# Patient Record
Sex: FEMALE | Race: WHITE | NOT HISPANIC OR LATINO | Employment: OTHER | URBAN - NONMETROPOLITAN AREA
[De-identification: names, ages, dates, MRNs, and addresses within clinical notes are randomized per-mention and may not be internally consistent; named-entity substitution may affect disease eponyms.]

---

## 2021-04-08 DIAGNOSIS — Z23 ENCOUNTER FOR IMMUNIZATION: ICD-10-CM

## 2023-10-23 ENCOUNTER — OFFICE VISIT (OUTPATIENT)
Dept: URGENT CARE | Facility: CLINIC | Age: 68
End: 2023-10-23
Payer: COMMERCIAL

## 2023-10-23 VITALS
WEIGHT: 195 LBS | RESPIRATION RATE: 18 BRPM | OXYGEN SATURATION: 98 % | HEART RATE: 74 BPM | BODY MASS INDEX: 34.55 KG/M2 | HEIGHT: 63 IN | TEMPERATURE: 96.7 F | DIASTOLIC BLOOD PRESSURE: 88 MMHG | SYSTOLIC BLOOD PRESSURE: 156 MMHG

## 2023-10-23 DIAGNOSIS — J01.00 ACUTE NON-RECURRENT MAXILLARY SINUSITIS: Primary | ICD-10-CM

## 2023-10-23 DIAGNOSIS — M79.645 PAIN OF LEFT THUMB: ICD-10-CM

## 2023-10-23 LAB
SARS-COV-2 AG UPPER RESP QL IA: NEGATIVE
VALID CONTROL: NORMAL

## 2023-10-23 PROCEDURE — 99213 OFFICE O/P EST LOW 20 MIN: CPT

## 2023-10-23 PROCEDURE — 87811 SARS-COV-2 COVID19 W/OPTIC: CPT

## 2023-10-23 PROCEDURE — S9083 URGENT CARE CENTER GLOBAL: HCPCS

## 2023-10-23 RX ORDER — LOSARTAN POTASSIUM 50 MG/1
50 TABLET ORAL EVERY MORNING
COMMUNITY
Start: 2023-10-04

## 2023-10-23 RX ORDER — LEVOTHYROXINE SODIUM 88 MCG
TABLET ORAL
COMMUNITY
Start: 2023-10-16

## 2023-10-23 RX ORDER — BENZONATATE 100 MG/1
100 CAPSULE ORAL 3 TIMES DAILY PRN
Qty: 20 CAPSULE | Refills: 0 | Status: SHIPPED | OUTPATIENT
Start: 2023-10-23 | End: 2023-10-30

## 2023-10-23 RX ORDER — AZITHROMYCIN 250 MG/1
TABLET, FILM COATED ORAL
Qty: 6 TABLET | Refills: 0 | Status: SHIPPED | OUTPATIENT
Start: 2023-10-23 | End: 2023-10-27

## 2023-10-23 RX ORDER — AZATHIOPRINE 50 MG/1
TABLET ORAL
COMMUNITY
Start: 2023-10-19

## 2023-10-23 NOTE — PROGRESS NOTES
[facility]        NAME: Ale Rodriguez is a 80 y.o. female  : 1955    MRN: [id number]  DATE: 2023  TIME: 4:55 PM    Assessment and Plan   Acute non-recurrent maxillary sinusitis [J01.00]  1. Acute non-recurrent maxillary sinusitis  Poct Covid 19 Rapid Antigen Test    azithromycin (ZITHROMAX) 250 mg tablet    benzonatate (TESSALON PERLES) 100 mg capsule      2. Pain of left thumb          Rapid POC COVID testing negative. Given symptom duration x6 days, will treat with azithromycin and Tessalon Perles. X-ray deferred at present time as lack of reported trauma/injury. Symptoms seem to correlate with osteoarthritis. Encouraged continued supportive measures. Follow up with PCP in 3-5 days or proceed to emergency department for worsening symptoms. Patient verbalized understanding of instructions given. Patient Instructions     Patient Instructions   Rapid POC COVID testing negative  Take antibiotic as prescribed  Continue with supportive measures, OTC Tylenol/Ibuprofen, nasal decongestants, and cough suppressants (Tessalon Perles)  Cool mist humidifiers, throat lozenges, increased fluid intake and rest   Follow up with PCP in 3-5 days  Present to ER if symptoms worsen     Sinusitis   AMBULATORY CARE:   Sinusitis  is inflammation or infection of your sinuses. Sinusitis is most often caused by a virus. Acute sinusitis may last up to 12 weeks. Chronic sinusitis lasts longer than 12 weeks. Recurrent sinusitis means you have 4 or more infections in 1 year.         Common signs and symptoms:   Fever    Pain, pressure, redness, or swelling around the forehead, cheeks, or eyes    Thick yellow or green discharge from your nose    Tenderness when you touch your face over your sinuses    Dry cough that happens mostly at night or when you lie down    Headache and face pain that is worse when you lean forward    Tooth pain, or pain when you chew    Seek care immediately if:   You have trouble breathing or wheezing that is getting worse. You have a stiff neck, a fever, or a bad headache. You cannot open your eye. Your eyeball bulges out or you cannot move your eye. You are more sleepy than normal, or you notice changes in your ability to think, move, or talk. You have swelling of your forehead or scalp. Call your doctor if:   You have vision changes, such as double vision. Your eye and eyelid are red, swollen, and painful. Your symptoms do not improve or go away after 10 days. You have nausea and are vomiting. Your nose is bleeding. You have questions or concerns about your condition or care. Medicines: Your symptoms may go away on their own. Your healthcare provider may recommend watchful waiting for up to 10 days before starting antibiotics. You may need any of the following:  Acetaminophen  decreases pain and fever. It is available without a doctor's order. Ask how much to take and how often to take it. Follow directions. Read the labels of all other medicines you are using to see if they also contain acetaminophen, or ask your doctor or pharmacist. Acetaminophen can cause liver damage if not taken correctly. NSAIDs , such as ibuprofen, help decrease swelling, pain, and fever. This medicine is available with or without a doctor's order. NSAIDs can cause stomach bleeding or kidney problems in certain people. If you take blood thinner medicine, always ask your healthcare provider if NSAIDs are safe for you. Always read the medicine label and follow directions. Nasal steroid sprays  may help decrease inflammation in your nose and sinuses. Decongestants  help reduce swelling and drain mucus in the nose and sinuses. They may help you breathe easier. Antihistamines  help dry mucus in the nose and relieve sneezing. Antibiotics  help treat or prevent a bacterial infection. Self-care:   Rinse your sinuses as directed.   Use a sinus rinse device to rinse your nasal passages with a saline (salt water) solution or distilled water. Do not use tap water. This will help thin the mucus in your nose and rinse away pollen and dirt. It will also help reduce swelling so you can breathe normally. Use a humidifier  to increase air moisture in your home. This may make it easier for you to breathe and help decrease your cough. Sleep with your head elevated. Place an extra pillow under your head before you go to sleep to help your sinuses drain. Drink liquids as directed. Ask your healthcare provider how much liquid to drink each day and which liquids are best for you. Liquids will thin the mucus in your nose and help it drain. Avoid drinks that contain alcohol or caffeine. Do not smoke, and avoid secondhand smoke. Nicotine and other chemicals in cigarettes and cigars can make your symptoms worse. Ask your healthcare provider for information if you currently smoke and need help to quit. E-cigarettes or smokeless tobacco still contain nicotine. Talk to your healthcare provider before you use these products. Prevent the spread of germs:   Wash your hands often with soap and water. Wash your hands after you use the bathroom, change a child's diaper, or sneeze. Wash your hands before you prepare or eat food. Stay away from people who are sick. Some germs spread easily and quickly through contact. Follow up with your doctor as directed: You may be referred to an ear, nose, and throat specialist. Write down your questions so you remember to ask them during your visits. © Copyright [employer] 2023 Information is for End User's use only and may not be sold, redistributed or otherwise used for commercial purposes. The above information is an  only. It is not intended as medical advice for individual conditions or treatments.  Talk to your doctor, nurse or pharmacist before following any medical regimen to see if it is safe and effective for you. Chief Complaint     Chief Complaint   Patient presents with    Cold Like Symptoms     C/o sore throat, headache, left side facial pain, nasal and chest congestion, and left thumb pain Onset x6 days ago, Pt states she had fertilizer spread in her yard and some was left on her driveway so she blew it off and symptoms started that same night so patient is concerned that is the cause. History of Present Illness       21-year-old female with a past medical history significant for hypertension presents with complaints of ongoing headache, nasal congestion, sinus pressure, postnasal drip, and cough x6 days. Denies fever, chills, vomiting, or diarrhea. No known sick contacts or exposures. Patient states that she has not been taking any OTC medications for her symptoms. Patient also reporting left thumb pain x2 days. Denies any specific known injury or trauma. Denies bruising, swelling, or redness. States that when she awakens in the morning feels "stiff" and then will have to perform movement to decrease stiffness and pain. Review of Systems   Review of Systems   Constitutional:  Negative for chills and fever. HENT:  Positive for congestion, postnasal drip, rhinorrhea, sinus pressure and sinus pain. Negative for ear discharge, ear pain, sore throat, trouble swallowing and voice change. Eyes:  Negative for discharge. Respiratory:  Positive for cough. Negative for shortness of breath and wheezing. Cardiovascular:  Negative for chest pain. Gastrointestinal:  Negative for abdominal pain, diarrhea, nausea and vomiting. Musculoskeletal:  Positive for arthralgias. Negative for joint swelling. Skin:  Negative for color change and rash. Neurological:  Negative for weakness and numbness.          Current Medications       Current Outpatient Medications:     azaTHIOprine (IMURAN) 50 mg tablet, , Disp: , Rfl:     azithromycin (ZITHROMAX) 250 mg tablet, Take 2 tablets today then 1 tablet daily x 4 days, Disp: 6 tablet, Rfl: 0    benzonatate (TESSALON PERLES) 100 mg capsule, Take 1 capsule (100 mg total) by mouth 3 (three) times a day as needed for cough for up to 7 days, Disp: 20 capsule, Rfl: 0    losartan (COZAAR) 50 mg tablet, 50 mg every morning, Disp: , Rfl:     Synthroid 88 MCG tablet, , Disp: , Rfl:     Current Allergies     Allergies as of 10/23/2023 - Reviewed 10/23/2023   Allergen Reaction Noted    Penicillins Rash 08/18/2014            The following portions of the patient's history were reviewed and updated as appropriate: allergies, current medications, past family history, past medical history, past social history, past surgical history and problem list.     Past Medical History:   Diagnosis Date    Autoimmune hepatitis ([address])     Disease of thyroid gland     Hypertension        Past Surgical History:   Procedure Laterality Date    KNEE SURGERY Bilateral        Family History   Problem Relation Age of Onset    Heart disease Mother     Cancer Father          Medications have been verified. Objective   /88   Pulse 74   Temp (!) 96.7 °F (35.9 °C)   Resp 18   Ht 5' 3" (1.6 m)   Wt 88.5 kg (195 lb)   SpO2 98%   BMI 34.54 kg/m²   No LMP recorded. Patient is postmenopausal.       Physical Exam     Physical Exam  Vitals and nursing note reviewed. Constitutional:       General: She is not in acute distress. Appearance: She is not toxic-appearing. HENT:      Head: Normocephalic. Right Ear: Tympanic membrane, ear canal and external ear normal.      Left Ear: Tympanic membrane, ear canal and external ear normal.      Nose: Congestion present. Right Sinus: No maxillary sinus tenderness or frontal sinus tenderness. Left Sinus: Maxillary sinus tenderness present. No frontal sinus tenderness. Mouth/Throat:      Mouth: Mucous membranes are moist.      Pharynx: Oropharynx is clear.    Eyes:      Conjunctiva/sclera: Conjunctivae normal. Cardiovascular:      Rate and Rhythm: Normal rate and regular rhythm. Heart sounds: Normal heart sounds. Pulmonary:      Effort: Pulmonary effort is normal. No respiratory distress. Breath sounds: Normal breath sounds. No stridor. No wheezing, rhonchi or rales. Musculoskeletal:         General: Tenderness present. No swelling. Left wrist: Normal.      Left hand: Bony tenderness present. No swelling. Normal range of motion. Normal strength. Normal sensation. Normal capillary refill. Comments: TTP diffusely over left thumb MCP joint without erythema or ecchymosis. No swelling. Full ROM. Lymphadenopathy:      Cervical: No cervical adenopathy. Skin:     General: Skin is warm and dry. Neurological:      Mental Status: She is alert and oriented to person, place, and time. Sensory: Sensation is intact. Motor: Motor function is intact. Gait: Gait is intact.    Psychiatric:         Mood and Affect: Mood normal.         Behavior: Behavior normal.

## 2023-10-23 NOTE — PATIENT INSTRUCTIONS
Rapid POC COVID testing negative  Take antibiotic as prescribed  Continue with supportive measures, OTC Tylenol/Ibuprofen, nasal decongestants, and cough suppressants (Tessalon Perles)  Cool mist humidifiers, throat lozenges, increased fluid intake and rest   Follow up with PCP in 3-5 days  Present to ER if symptoms worsen     Sinusitis   AMBULATORY CARE:   Sinusitis  is inflammation or infection of your sinuses. Sinusitis is most often caused by a virus. Acute sinusitis may last up to 12 weeks. Chronic sinusitis lasts longer than 12 weeks. Recurrent sinusitis means you have 4 or more infections in 1 year. Common signs and symptoms:   Fever    Pain, pressure, redness, or swelling around the forehead, cheeks, or eyes    Thick yellow or green discharge from your nose    Tenderness when you touch your face over your sinuses    Dry cough that happens mostly at night or when you lie down    Headache and face pain that is worse when you lean forward    Tooth pain, or pain when you chew    Seek care immediately if:   You have trouble breathing or wheezing that is getting worse. You have a stiff neck, a fever, or a bad headache. You cannot open your eye. Your eyeball bulges out or you cannot move your eye. You are more sleepy than normal, or you notice changes in your ability to think, move, or talk. You have swelling of your forehead or scalp. Call your doctor if:   You have vision changes, such as double vision. Your eye and eyelid are red, swollen, and painful. Your symptoms do not improve or go away after 10 days. You have nausea and are vomiting. Your nose is bleeding. You have questions or concerns about your condition or care. Medicines: Your symptoms may go away on their own. Your healthcare provider may recommend watchful waiting for up to 10 days before starting antibiotics. You may need any of the following:  Acetaminophen  decreases pain and fever.  It is available without a doctor's order. Ask how much to take and how often to take it. Follow directions. Read the labels of all other medicines you are using to see if they also contain acetaminophen, or ask your doctor or pharmacist. Acetaminophen can cause liver damage if not taken correctly. NSAIDs , such as ibuprofen, help decrease swelling, pain, and fever. This medicine is available with or without a doctor's order. NSAIDs can cause stomach bleeding or kidney problems in certain people. If you take blood thinner medicine, always ask your healthcare provider if NSAIDs are safe for you. Always read the medicine label and follow directions. Nasal steroid sprays  may help decrease inflammation in your nose and sinuses. Decongestants  help reduce swelling and drain mucus in the nose and sinuses. They may help you breathe easier. Antihistamines  help dry mucus in the nose and relieve sneezing. Antibiotics  help treat or prevent a bacterial infection. Self-care:   Rinse your sinuses as directed. Use a sinus rinse device to rinse your nasal passages with a saline (salt water) solution or distilled water. Do not use tap water. This will help thin the mucus in your nose and rinse away pollen and dirt. It will also help reduce swelling so you can breathe normally. Use a humidifier  to increase air moisture in your home. This may make it easier for you to breathe and help decrease your cough. Sleep with your head elevated. Place an extra pillow under your head before you go to sleep to help your sinuses drain. Drink liquids as directed. Ask your healthcare provider how much liquid to drink each day and which liquids are best for you. Liquids will thin the mucus in your nose and help it drain. Avoid drinks that contain alcohol or caffeine. Do not smoke, and avoid secondhand smoke. Nicotine and other chemicals in cigarettes and cigars can make your symptoms worse.  Ask your healthcare provider for information if you currently smoke and need help to quit. E-cigarettes or smokeless tobacco still contain nicotine. Talk to your healthcare provider before you use these products. Prevent the spread of germs:   Wash your hands often with soap and water. Wash your hands after you use the bathroom, change a child's diaper, or sneeze. Wash your hands before you prepare or eat food. Stay away from people who are sick. Some germs spread easily and quickly through contact. Follow up with your doctor as directed: You may be referred to an ear, nose, and throat specialist. Write down your questions so you remember to ask them during your visits. © Copyright [facility] 2023 Information is for End User's use only and may not be sold, redistributed or otherwise used for commercial purposes. The above information is an  only. It is not intended as medical advice for individual conditions or treatments. Talk to your doctor, nurse or pharmacist before following any medical regimen to see if it is safe and effective for you.

## 2023-12-28 DIAGNOSIS — M79.604 PAIN IN RIGHT LEG: ICD-10-CM

## 2023-12-28 DIAGNOSIS — R09.89 OTHER SPECIFIED SYMPTOMS AND SIGNS INVOLVING THE CIRCULATORY AND RESPIRATORY SYSTEMS: ICD-10-CM

## 2024-01-19 ENCOUNTER — HOSPITAL ENCOUNTER (OUTPATIENT)
Dept: NON INVASIVE DIAGNOSTICS | Facility: HOSPITAL | Age: 69
Discharge: HOME | End: 2024-01-19
Payer: COMMERCIAL

## 2024-01-19 VITALS
HEART RATE: 74 BPM | BODY MASS INDEX: 34.57 KG/M2 | SYSTOLIC BLOOD PRESSURE: 156 MMHG | HEIGHT: 63 IN | DIASTOLIC BLOOD PRESSURE: 88 MMHG | WEIGHT: 195.11 LBS

## 2024-01-19 DIAGNOSIS — R07.89 OTHER CHEST PAIN: ICD-10-CM

## 2024-01-19 DIAGNOSIS — I51.89 OTHER ILL-DEFINED HEART DISEASES: ICD-10-CM

## 2024-01-19 DIAGNOSIS — R79.89 OTHER SPECIFIED ABNORMAL FINDINGS OF BLOOD CHEMISTRY: ICD-10-CM

## 2024-01-19 DIAGNOSIS — R01.1 CARDIAC MURMUR, UNSPECIFIED: ICD-10-CM

## 2024-01-19 PROCEDURE — 93306 TTE W/DOPPLER COMPLETE: CPT

## 2024-01-21 LAB
AORTIC ROOT: 3.2 CM
AORTIC VALVE MEAN VELOCITY: 8.7 M/S
APICAL FOUR CHAMBER EJECTION FRACTION: 77 %
ASCENDING AORTA: 3 CM
AV AREA BY CONTINUOUS VTI: 3.8 CM2
AV AREA PEAK VELOCITY: 3.3 CM2
AV LVOT MEAN GRADIENT: 4 MMHG
AV LVOT PEAK GRADIENT: 9 MMHG
AV MEAN GRADIENT: 4 MMHG
AV PEAK GRADIENT: 8 MMHG
AV VALVE AREA: 3.79 CM2
AV VELOCITY RATIO: 1.06
BSA FOR ECHO PROCEDURE: 1.91 M2
DOP CALC AO PEAK VEL: 1.38 M/S
DOP CALC AO VTI: 28.81 CM
DOP CALC LVOT AREA: 3.14 CM2
DOP CALC LVOT CARDIAC INDEX: 4 L/MIN/M2
DOP CALC LVOT CARDIAC OUTPUT: 7.64 L/MIN
DOP CALC LVOT DIAMETER: 2 CM
DOP CALC LVOT PEAK VEL VTI: 34.78 CM
DOP CALC LVOT PEAK VEL: 1.46 M/S
DOP CALC LVOT STROKE INDEX: 59.2 ML/M2
DOP CALC LVOT STROKE VOLUME: 109.21
E WAVE DECELERATION TIME: 216 MS
E/A RATIO: 1.18
FRACTIONAL SHORTENING: 46 (ref 28–44)
INTERVENTRICULAR SEPTUM IN DIASTOLE (PARASTERNAL SHORT AXIS VIEW): 1 CM
INTERVENTRICULAR SEPTUM: 1 CM (ref 0.6–1.1)
LAAS-AP2: 18.2 CM2
LAAS-AP4: 19 CM2
LEFT ATRIUM SIZE: 3.3 CM
LEFT ATRIUM VOLUME (MOD BIPLANE): 55 ML
LEFT ATRIUM VOLUME INDEX (MOD BIPLANE): 28.8 ML/M2
LEFT INTERNAL DIMENSION IN SYSTOLE: 2 CM (ref 2.1–4)
LEFT VENTRICLE DIASTOLIC VOLUME (MOD BIPLANE): 54 ML
LEFT VENTRICLE DIASTOLIC VOLUME INDEX (MOD BIPLANE): 28.3 ML/M2
LEFT VENTRICLE SYSTOLIC VOLUME (MOD BIPLANE): 14 ML
LEFT VENTRICLE SYSTOLIC VOLUME INDEX (MOD BIPLANE): 7.3 ML/M2
LEFT VENTRICULAR INTERNAL DIMENSION IN DIASTOLE: 3.7 CM (ref 3.5–6)
LEFT VENTRICULAR POSTERIOR WALL IN END DIASTOLE: 1.2 CM
LEFT VENTRICULAR STROKE VOLUME: 45 ML
LV EF: 74 %
LVSV (TEICH): 45 ML
MV E'TISSUE VEL-LAT: 6 CM/S
MV E'TISSUE VEL-SEP: 6 CM/S
MV PEAK A VEL: 0.79 M/S
MV PEAK E VEL: 93 CM/S
MV STENOSIS PRESSURE HALF TIME: 63 MS
MV VALVE AREA P 1/2 METHOD: 3.49
RIGHT ATRIUM AREA SYSTOLE A4C: 19 CM2
RIGHT VENTRICLE ID DIMENSION: 3.3 CM
SL CV LEFT ATRIUM LENGTH A2C: 5.3 CM
SL CV LV EF: 65
SL CV PED ECHO LEFT VENTRICLE DIASTOLIC VOLUME (MOD BIPLANE) 2D: 58 ML
SL CV PED ECHO LEFT VENTRICLE SYSTOLIC VOLUME (MOD BIPLANE) 2D: 13 ML
TR MAX PG: 28 MMHG
TR PEAK VELOCITY: 2.7 M/S
TRICUSPID ANNULAR PLANE SYSTOLIC EXCURSION: 2.5 CM
TRICUSPID VALVE PEAK REGURGITATION VELOCITY: 2.65 M/S

## 2024-01-21 PROCEDURE — 93306 TTE W/DOPPLER COMPLETE: CPT | Performed by: INTERNAL MEDICINE

## 2024-02-19 ENCOUNTER — HOSPITAL ENCOUNTER (OUTPATIENT)
Dept: NON INVASIVE DIAGNOSTICS | Facility: HOSPITAL | Age: 69
Discharge: HOME | End: 2024-02-19
Attending: INTERNAL MEDICINE
Payer: COMMERCIAL

## 2024-02-19 DIAGNOSIS — I10 ESSENTIAL (PRIMARY) HYPERTENSION: ICD-10-CM

## 2024-02-19 DIAGNOSIS — R07.9 CHEST PAIN, UNSPECIFIED TYPE: ICD-10-CM

## 2024-02-19 LAB
CHEST PAIN STATEMENT: NORMAL
MAX DIASTOLIC BP: 80 MMHG
MAX HR PERCENT: 92 %
MAX HR: 141 BPM
MAX PREDICTED HEART RATE: 153 BPM
PROTOCOL NAME: NORMAL
RATE PRESSURE PRODUCT: NORMAL
REASON FOR TERMINATION: NORMAL
SL CV LV EF: 60
SL CV STRESS RECOVERY BP: NORMAL MMHG
SL CV STRESS RECOVERY HR: 88 BPM
SL CV STRESS RECOVERY O2 SAT: 97 %
SL CV STRESS STAGE REACHED: 1
STRESS ANGINA INDEX: 0
STRESS BASELINE BP: NORMAL MMHG
STRESS BASELINE HR: 66 BPM
STRESS O2 SAT REST: 96 %
STRESS PEAK HR: 141 BPM
STRESS POST ESTIMATED WORKLOAD: 4.6 METS
STRESS POST EXERCISE DUR MIN: 2 MIN
STRESS POST EXERCISE DUR MIN: 2 MIN
STRESS POST EXERCISE DUR SEC: 30 SEC
STRESS POST EXERCISE DUR SEC: 30 SEC
STRESS POST O2 SAT PEAK: 96 %
STRESS POST PEAK BP: 220 MMHG
STRESS POST PEAK HR: 141 BPM
STRESS POST PEAK SYSTOLIC BP: 220 MMHG
TARGET HR FORMULA: NORMAL
TEST INDICATION: NORMAL

## 2024-02-19 PROCEDURE — 93350 STRESS TTE ONLY: CPT

## 2024-02-19 RX ORDER — ROSUVASTATIN CALCIUM 5 MG/1
5 TABLET, COATED ORAL DAILY
COMMUNITY
Start: 2023-12-28

## 2024-02-19 RX ORDER — KETOCONAZOLE 20 MG/G
CREAM TOPICAL
COMMUNITY
Start: 2024-01-05

## 2024-02-19 RX ORDER — FEXOFENADINE HCL 180 MG/1
180 TABLET ORAL DAILY
COMMUNITY

## 2024-02-19 RX ORDER — DOCUSATE SODIUM 100 MG/1
100 CAPSULE, LIQUID FILLED ORAL 2 TIMES DAILY
COMMUNITY

## 2024-02-19 RX ORDER — MULTIVIT WITH MINERALS/LUTEIN
TABLET ORAL
COMMUNITY

## 2024-02-19 RX ORDER — CYANOCOBALAMIN (VITAMIN B-12) 1000 MCG
TABLET ORAL DAILY
COMMUNITY

## 2024-02-19 RX ORDER — MONTELUKAST SODIUM 10 MG/1
10 TABLET ORAL DAILY
COMMUNITY
Start: 2024-01-22

## 2024-02-19 RX ORDER — LATANOPROST 50 UG/ML
1 SOLUTION/ DROPS OPHTHALMIC
COMMUNITY

## 2024-02-22 ENCOUNTER — HOSPITAL ENCOUNTER (EMERGENCY)
Facility: HOSPITAL | Age: 69
Discharge: HOME | End: 2024-02-22
Attending: EMERGENCY MEDICINE | Admitting: EMERGENCY MEDICINE
Payer: COMMERCIAL

## 2024-02-22 ENCOUNTER — APPOINTMENT (EMERGENCY)
Dept: RADIOLOGY | Facility: HOSPITAL | Age: 69
End: 2024-02-22
Payer: COMMERCIAL

## 2024-02-22 VITALS
RESPIRATION RATE: 16 BRPM | DIASTOLIC BLOOD PRESSURE: 108 MMHG | SYSTOLIC BLOOD PRESSURE: 184 MMHG | HEART RATE: 113 BPM | OXYGEN SATURATION: 96 % | TEMPERATURE: 97.6 F

## 2024-02-22 DIAGNOSIS — S83.91XA SPRAIN OF RIGHT KNEE: Primary | ICD-10-CM

## 2024-02-22 DIAGNOSIS — S76.912A MUSCLE STRAIN OF LEFT THIGH, INITIAL ENCOUNTER: ICD-10-CM

## 2024-02-22 PROCEDURE — 99284 EMERGENCY DEPT VISIT MOD MDM: CPT | Performed by: EMERGENCY MEDICINE

## 2024-02-22 PROCEDURE — 99283 EMERGENCY DEPT VISIT LOW MDM: CPT

## 2024-02-22 PROCEDURE — 73552 X-RAY EXAM OF FEMUR 2/>: CPT

## 2024-02-22 PROCEDURE — 73564 X-RAY EXAM KNEE 4 OR MORE: CPT

## 2024-02-22 RX ORDER — ACETAMINOPHEN 325 MG/1
975 TABLET ORAL ONCE
Status: COMPLETED | OUTPATIENT
Start: 2024-02-22 | End: 2024-02-22

## 2024-02-22 RX ADMIN — ACETAMINOPHEN 975 MG: 325 TABLET, FILM COATED ORAL at 21:53

## 2024-02-23 NOTE — ED PROVIDER NOTES
History  Chief Complaint   Patient presents with    Leg Injury     Pt states she slipped and fell getting out of the bathtub approx 8 hours ago, landing on her left leg; denies hitting head, no loc, no thinners     Patient is a 68-year-old female presents the emergency department due to injury to the left thigh and right knee she slipped and fell on the tub and struck her left thigh and right knee no strike on head or loss of consciousness no anticoagulants no other injury patient is ambulatory in the ED without issue no restricted range of motion.        History provided by:  Patient      Prior to Admission Medications   Prescriptions Last Dose Informant Patient Reported? Taking?   Ascorbic Acid (vitamin C) 1000 MG tablet   Yes No   Sig: Take by mouth   Calcium-Vitamin D-Vitamin K 500-1000-40 MG-UNT-MCG CHEW   Yes No   Sig: Chew   Cholecalciferol 25 MCG (1000 UT) tablet   Yes No   Sig: Take 1,000 Units by mouth   Cyanocobalamin (B-12) 1000 MCG TABS   Yes No   Sig: Take by mouth daily   Synthroid 88 MCG tablet   Yes No   azaTHIOprine (IMURAN) 50 mg tablet   Yes No   docusate sodium (COLACE) 100 mg capsule   Yes No   Sig: Take 100 mg by mouth 2 (two) times a day   fexofenadine (ALLEGRA) 180 MG tablet   Yes No   Sig: Take 180 mg by mouth daily   hydrocortisone 2.5 % cream   Yes No   ketoconazole (NIZORAL) 2 % cream   Yes No   latanoprost (XALATAN) 0.005 % ophthalmic solution   Yes No   Si drop daily at bedtime   losartan (COZAAR) 50 mg tablet   Yes No   Si mg every morning   montelukast (SINGULAIR) 10 mg tablet   Yes No   Sig: Take 10 mg by mouth daily   rosuvastatin (CRESTOR) 5 mg tablet   Yes No   Sig: Take 5 mg by mouth daily      Facility-Administered Medications: None       Past Medical History:   Diagnosis Date    Autoimmune hepatitis (HCC)     Disease of thyroid gland     Hypertension        Past Surgical History:   Procedure Laterality Date    KNEE SURGERY Bilateral        Family History   Problem  Relation Age of Onset    Heart disease Mother     Cancer Father      I have reviewed and agree with the history as documented.    E-Cigarette/Vaping    E-Cigarette Use Never User      E-Cigarette/Vaping Substances     Social History     Tobacco Use    Smoking status: Never    Smokeless tobacco: Never   Vaping Use    Vaping status: Never Used   Substance Use Topics    Alcohol use: Not Currently    Drug use: Never       Review of Systems   Constitutional:  Negative for activity change, appetite change, chills, fatigue and fever.   HENT:  Negative for congestion, ear pain, rhinorrhea and sore throat.    Eyes:  Negative for discharge, redness and visual disturbance.   Respiratory:  Negative for cough, chest tightness, shortness of breath and wheezing.    Cardiovascular:  Negative for chest pain and palpitations.   Gastrointestinal:  Negative for abdominal pain, constipation, diarrhea, nausea and vomiting.   Endocrine: Negative for polydipsia and polyuria.   Genitourinary:  Negative for difficulty urinating, dysuria, frequency, hematuria and urgency.   Musculoskeletal:  Negative for arthralgias and myalgias.        Left thigh pain right knee pain   Skin:  Negative for color change, pallor and rash.   Neurological:  Negative for dizziness, weakness, light-headedness, numbness and headaches.   Hematological:  Negative for adenopathy. Does not bruise/bleed easily.   All other systems reviewed and are negative.      Physical Exam  Physical Exam  Vitals and nursing note reviewed.   Constitutional:       Appearance: She is well-developed.   HENT:      Head: Normocephalic and atraumatic.      Right Ear: External ear normal.      Left Ear: External ear normal.      Nose: Nose normal.   Eyes:      Conjunctiva/sclera: Conjunctivae normal.      Pupils: Pupils are equal, round, and reactive to light.   Cardiovascular:      Rate and Rhythm: Normal rate and regular rhythm.      Heart sounds: Normal heart sounds.   Pulmonary:       Effort: Pulmonary effort is normal. No respiratory distress.      Breath sounds: Normal breath sounds. No wheezing or rales.   Chest:      Chest wall: No tenderness.   Abdominal:      General: Bowel sounds are normal. There is no distension.      Palpations: Abdomen is soft.      Tenderness: There is no abdominal tenderness. There is no guarding.   Musculoskeletal:         General: Normal range of motion.      Cervical back: Normal range of motion and neck supple.      Left upper leg: Swelling and tenderness present. No deformity.      Right knee: Swelling present. No deformity. Normal range of motion. Tenderness present.   Skin:     General: Skin is warm and dry.   Neurological:      Mental Status: She is alert and oriented to person, place, and time.      Cranial Nerves: No cranial nerve deficit.      Sensory: No sensory deficit.         Vital Signs  ED Triage Vitals [02/22/24 2038]   Temperature Pulse Respirations Blood Pressure SpO2   97.6 °F (36.4 °C) (!) 113 16 (!) 184/108 96 %      Temp Source Heart Rate Source Patient Position - Orthostatic VS BP Location FiO2 (%)   Temporal Monitor -- -- --      Pain Score       10 - Worst Possible Pain           Vitals:    02/22/24 2038   BP: (!) 184/108   Pulse: (!) 113         Visual Acuity      ED Medications  Medications - No data to display    Diagnostic Studies  Results Reviewed       None                   XR knee 4+ views Right injury   ED Interpretation by Aman Rodriguez DO (02/22 2142)   Osteoarthritis no acute fracture or dislocation      XR femur 2 views LEFT   ED Interpretation by Aman Rodriguez DO (02/22 2142)   No acute fracture or dislocation                 Procedures  Procedures         ED Course                               SBIRT 22yo+      Flowsheet Row Most Recent Value   Initial Alcohol Screen: US AUDIT-C     1. How often do you have a drink containing alcohol? 0 Filed at: 02/22/2024 2141   2. How many drinks containing alcohol do you have on a  typical day you are drinking?  0 Filed at: 02/22/2024 2141   3b. FEMALE Any Age, or MALE 65+: How often do you have 4 or more drinks on one occassion? 0 Filed at: 02/22/2024 2141   Audit-C Score 0 Filed at: 02/22/2024 2141   SONYA: How many times in the past year have you...    Used an illegal drug or used a prescription medication for non-medical reasons? Never Filed at: 02/22/2024 2141                      Medical Decision Making  Patient is clinically hemodynamically neurologically stable in the emergency department she is neurovascular intact distal to injuries x-rays reveal no acute fracture or dislocation suspect strain of thigh muscle and right knee sprain for now recommended rest ice Tylenol Motrin as needed and supportive care and limit strenuous activity and follow-up promptly with PCP and orthopedics for further evaluation and treatment return precautions and anticipatory guidance discussed.      Problems Addressed:  Muscle strain of left thigh, initial encounter: acute illness or injury  Sprain of right knee: acute illness or injury    Amount and/or Complexity of Data Reviewed  Radiology: ordered and independent interpretation performed. Decision-making details documented in ED Course.             Disposition  Final diagnoses:   Sprain of right knee   Muscle strain of left thigh, initial encounter     Time reflects when diagnosis was documented in both MDM as applicable and the Disposition within this note       Time User Action Codes Description Comment    2/22/2024  9:43 PM Aman Rodriguez Add [S83.91XA] Sprain of right knee     2/22/2024  9:44 PM Aman Rodriguez Add [S76.912A] Muscle strain of left thigh, initial encounter           ED Disposition       ED Disposition   Discharge    Condition   Stable    Date/Time   Thu Feb 22, 2024 2143    Comment   Fatmata Kwon discharge to home/self care.                   Follow-up Information       Follow up With Specialties Details Why Contact Info Additional  Information    Anyi Villanueva MD Internal Medicine Schedule an appointment as soon as possible for a visit in 3 days  [address] [facility] [id number]  [phone]       [facility] Orthopedic Surgery Schedule an appointment as soon as possible for a visit in 3 days  [address] [facility] [address]  [address] [place] [id number] [phone] [facility], [address], [address] Floor, [place], [address] [phone]             Patient's Medications   Discharge Prescriptions    No medications on file       No discharge procedures on file.    PDMP Review       None            ED Provider  Electronically Signed by             Aman Rodriguez DO  02/22/24 8222

## 2024-02-24 ENCOUNTER — TELEPHONE (OUTPATIENT)
Dept: OTHER | Facility: OTHER | Age: 69
End: 2024-02-24

## 2024-02-24 NOTE — TELEPHONE ENCOUNTER
Pt would like to schedule follow up appt after being discharged from hospital. Please call pt back to schedule

## 2024-02-28 ENCOUNTER — OFFICE VISIT (OUTPATIENT)
Dept: OBGYN CLINIC | Facility: CLINIC | Age: 69
End: 2024-02-28
Payer: COMMERCIAL

## 2024-02-28 VITALS
WEIGHT: 190.4 LBS | TEMPERATURE: 97.7 F | DIASTOLIC BLOOD PRESSURE: 70 MMHG | HEIGHT: 63 IN | SYSTOLIC BLOOD PRESSURE: 130 MMHG | HEART RATE: 84 BPM | BODY MASS INDEX: 33.73 KG/M2

## 2024-02-28 DIAGNOSIS — M17.0 PRIMARY OSTEOARTHRITIS OF BOTH KNEES: ICD-10-CM

## 2024-02-28 DIAGNOSIS — S70.12XA CONTUSION OF LEFT THIGH, INITIAL ENCOUNTER: ICD-10-CM

## 2024-02-28 DIAGNOSIS — M25.561 ACUTE PAIN OF RIGHT KNEE: Primary | ICD-10-CM

## 2024-02-28 DIAGNOSIS — S80.01XA CONTUSION OF RIGHT KNEE, INITIAL ENCOUNTER: ICD-10-CM

## 2024-02-28 PROCEDURE — 99204 OFFICE O/P NEW MOD 45 MIN: CPT | Performed by: ORTHOPAEDIC SURGERY

## 2024-02-28 NOTE — PROGRESS NOTES
ASSESSMENT/PLAN:    Diagnoses and all orders for this visit:    Acute pain of right knee    Contusion of left thigh, initial encounter    Contusion of right knee, initial encounter    Primary osteoarthritis of both knees    Autoimmune hepatitis        Plan: Treatment was discussed.  At this time, she did not want to pursue a formal course of physical therapy but will contact me if she were to change her mind.  I will see her in 2 weeks for reevaluation.  She may transition from the use of ice to moist heat, especially for her left thigh.  She is permitted activity as tolerated.  In regards to analgesic medication, she was told in the past not to take Tylenol because of her autoimmune hepatitis but told by the emergency room it was safer to take Tylenol than it was to take anti-inflammatory medications.  I have suggested she contact her GI physicians who treat her autoimmune hepatitis to see what they would recommend.  Her liver function tests which were recently obtained are normal.  I have encouraged her to contact me if questions or concerns arise prior to follow-up.    Return in about 2 weeks (around 3/13/2024).      _____________________________________________________  CHIEF COMPLAINT:  Chief Complaint   Patient presents with    Right Knee - Pain    Left Lower Leg - Pain         SUBJECTIVE:  Fatmata Kwon is a 68 y.o. year old female who presents for evaluation of her lower extremities.  She was getting out of the tub on 2/22/2024 when her left leg slipped as she placed the leg on the floor causing a stretching injury to the posterior thigh as well as impact on the frame of the tub.  Her right knee subsequently struck the interior of the tub.  She was seen in the emergency room at [facility], x-rays were obtained and she was instructed to follow-up with her primary care physician as well as with orthopedics.  She notes minor complaints referable to her right knee at this time, pain primarily  noted when she gets up from sitting for a while.  Her left posterior thigh continues to bother her with only minimal improvement.  She has noted a significant degree of ecchymosis in the left thigh and she has had some pain radiating into her left calf.  She denies any shortness of breath, chest pains or lower extremity paresthesias.  She has previously undergone bilateral knee arthroscopies but denies any lower extremity complaints of a chronic nature.    PAST MEDICAL HISTORY:  Past Medical History:   Diagnosis Date    Autoimmune hepatitis (HCC)     Disease of thyroid gland     Hypertension     Osteoarthritis        PAST SURGICAL HISTORY:  Past Surgical History:   Procedure Laterality Date    KNEE SURGERY Bilateral        FAMILY HISTORY:  Family History   Problem Relation Age of Onset    Heart disease Mother     Diabetes Mother     Cancer Father        SOCIAL HISTORY:  Social History     Tobacco Use    Smoking status: Never    Smokeless tobacco: Never   Vaping Use    Vaping status: Never Used   Substance Use Topics    Alcohol use: Not Currently    Drug use: Never       MEDICATIONS:    Current Outpatient Medications:     Ascorbic Acid (vitamin C) 1000 MG tablet, Take by mouth, Disp: , Rfl:     azaTHIOprine (IMURAN) 50 mg tablet, , Disp: , Rfl:     Calcium-Vitamin D-Vitamin K 500-1000-40 MG-UNT-MCG CHEW, Chew, Disp: , Rfl:     Cholecalciferol 25 MCG (1000 UT) tablet, Take 1,000 Units by mouth, Disp: , Rfl:     Cyanocobalamin (B-12) 1000 MCG TABS, Take by mouth daily, Disp: , Rfl:     docusate sodium (COLACE) 100 mg capsule, Take 100 mg by mouth 2 (two) times a day, Disp: , Rfl:     fexofenadine (ALLEGRA) 180 MG tablet, Take 180 mg by mouth daily, Disp: , Rfl:     hydrocortisone 2.5 % cream, , Disp: , Rfl:     ketoconazole (NIZORAL) 2 % cream, , Disp: , Rfl:     latanoprost (XALATAN) 0.005 % ophthalmic solution, 1 drop daily at bedtime, Disp: , Rfl:     losartan (COZAAR) 50 mg tablet, 100 mg every morning, Disp: ,  "Rfl:     montelukast (SINGULAIR) 10 mg tablet, Take 10 mg by mouth daily, Disp: , Rfl:     rosuvastatin (CRESTOR) 5 mg tablet, Take 5 mg by mouth daily, Disp: , Rfl:     Synthroid 88 MCG tablet, , Disp: , Rfl:     ALLERGIES:  Allergies   Allergen Reactions    Penicillins Rash       Review of systems:   Constitutional: Negative for fatigue, fever or loss of apetite.   HENT: Negative.    Respiratory: Negative for shortness of breath, dyspnea.    Cardiovascular: Negative for chest pain/tightness.   Gastrointestinal: Negative for abdominal pain, N/V.   Endocrine: Negative for cold/heat intolerance, unexplained weight loss/gain.   Genitourinary: Negative for flank pain, dysuria, hematuria.   Musculoskeletal: Positive as in the HPI   Skin: Negative for rash.    Neurological: Negative  Psychiatric/Behavioral: Negative for agitation.  _____________________________________________________  PHYSICAL EXAMINATION:    Blood pressure 130/70, pulse 84, temperature 97.7 °F (36.5 °C), temperature source Temporal, height 5' 3\" (1.6 m), weight 86.4 kg (190 lb 6.4 oz).    General: well developed and well nourished, alert, oriented times 3, and appears comfortable  Psychiatric: Normal  HEENT: Benign  Cardiovascular: Regular    Pulmonary: No wheezing or stridor  Abdomen: Soft, Nontender  Skin: No masses, erythema, lacerations, fluctation, ulcerations  Neurovascular: Motor and sensory exams are grossly intact although she did demonstrate some weakness of resisted left knee flexion despite denying any pain.  Pulses are palpable.    MUSCULOSKELETAL EXAMINATION:    The right knee exam demonstrates resolving ecchymosis.  She has full extension and flexion to 130 degrees without complaints but there is crepitus noted during active and passive motion.  Ligaments are stable.  She does complain of some tenderness in the medial knee including the tibial plateau, joint line and femoral condyle.  The lateral knee was nontender.  The patella, patella " tendon and tibial tubercle were nontender.  Meghan's test is negative.  There is no obvious effusion.  The remainder the right lower extremity exam is benign.    The left lower extremity exam demonstrates good range of motion of the hip, knee and ankle/foot.  She did complain of some pain with hip flexion beyond 90 degrees localized to the posterior thigh.  She denies any pain with straight leg raise to 60 degrees.  She has significant ecchymosis noted in the posterior and medial aspect of the left thigh.  She complains of tenderness with palpation along the posterior and lateral aspect of the thigh and denied any significant pain with palpation in the area of ecchymosis.  She has no tenderness to palpation of the calf musculature, calf compartments are soft and nontender and Homans' sign is negative.  The remainder of the left lower extremity examination is benign.      _____________________________________________________  STUDIES REVIEWED:  X-rays of her left femur demonstrate no evidence of acute bony injury.  Although inadequately visualized, the knee does demonstrate degenerative changes.  X-rays of the right knee demonstrate no acute abnormality.  There is a moderate degree of degenerative change noted about the right knee.    X-ray reports were reviewed.    The ER note was reviewed.      Willian Calvillo

## 2024-03-13 ENCOUNTER — OFFICE VISIT (OUTPATIENT)
Dept: OBGYN CLINIC | Facility: CLINIC | Age: 69
End: 2024-03-13
Payer: COMMERCIAL

## 2024-03-13 VITALS
BODY MASS INDEX: 33.66 KG/M2 | WEIGHT: 190 LBS | TEMPERATURE: 97.6 F | HEART RATE: 74 BPM | SYSTOLIC BLOOD PRESSURE: 120 MMHG | HEIGHT: 63 IN | DIASTOLIC BLOOD PRESSURE: 80 MMHG

## 2024-03-13 DIAGNOSIS — M17.0 PRIMARY OSTEOARTHRITIS OF BOTH KNEES: ICD-10-CM

## 2024-03-13 DIAGNOSIS — S70.12XA CONTUSION OF LEFT THIGH, INITIAL ENCOUNTER: ICD-10-CM

## 2024-03-13 DIAGNOSIS — M79.662 PAIN OF LEFT CALF: Primary | ICD-10-CM

## 2024-03-13 PROCEDURE — 99213 OFFICE O/P EST LOW 20 MIN: CPT | Performed by: ORTHOPAEDIC SURGERY

## 2024-03-13 NOTE — PROGRESS NOTES
ASSESSMENT/PLAN:    Diagnoses and all orders for this visit:    Pain of left calf  -     VAS VENOUS DUPLEX -LOWER LIMB UNILATERAL; Future    Contusion of left thigh, initial encounter    Primary osteoarthritis of both knees    Although the exam findings are low for DVT the patient has concerns about this due to her strong family history.  Patient was encouraged to start an aspirin 325 mg twice a day.  We can order a left lower extremity Doppler ultrasound as a nonemergent test and have results go to the PCP.  Patient will continue with her range of motion and icing and Tylenol General pain.  Can evaluate the patient back in 2 to 3 weeks for general follow-up.  Patient was shown gentle stretching exercises for the IT band as well as ice and avoidance of laying on that side is much as possible    Return 2-3 weeks.  _____________________________________________________  CHIEF COMPLAINT:  Chief Complaint   Patient presents with    Follow-up     SUBJECTIVE:  Fatmata Kwon is a 68 y.o. year old female who presents for follow up of bilateral leg contusions and primary arthritis both knees with autoimmune hepatitis.  He was seen initially in orthopedic office 2/28/2024.  General she notes that she is doing better.  She did realize that the ecchymosis is traveling distally and her left leg.  She notes the left hamstring discomfort that she had previously is greatly improved.  Does note that she has some pain in the left calf.  She is concerned for this as she has had a brother sister and mother who have had blood clots in the past.  She also notes that since she is not able to sleep comfortably on her left side that she been sleeping more on her right side and is developing localized tenderness over the side of her right hip.  Knees are feeling better in general.    PAST MEDICAL HISTORY:  Past Medical History:   Diagnosis Date    Autoimmune hepatitis (HCC)     Disease of thyroid gland     Hypertension      Osteoarthritis      PAST SURGICAL HISTORY:  Past Surgical History:   Procedure Laterality Date    KNEE SURGERY Bilateral      FAMILY HISTORY:  Family History   Problem Relation Age of Onset    Heart disease Mother     Diabetes Mother     Cancer Father      SOCIAL HISTORY:  Social History     Tobacco Use    Smoking status: Never    Smokeless tobacco: Never   Vaping Use    Vaping status: Never Used   Substance Use Topics    Alcohol use: Not Currently    Drug use: Never     MEDICATIONS:    Current Outpatient Medications:     Ascorbic Acid (vitamin C) 1000 MG tablet, Take by mouth, Disp: , Rfl:     azaTHIOprine (IMURAN) 50 mg tablet, , Disp: , Rfl:     Calcium-Vitamin D-Vitamin K 500-1000-40 MG-UNT-MCG CHEW, Chew, Disp: , Rfl:     Cholecalciferol 25 MCG (1000 UT) tablet, Take 1,000 Units by mouth, Disp: , Rfl:     Cyanocobalamin (B-12) 1000 MCG TABS, Take by mouth daily, Disp: , Rfl:     docusate sodium (COLACE) 100 mg capsule, Take 100 mg by mouth 2 (two) times a day, Disp: , Rfl:     fexofenadine (ALLEGRA) 180 MG tablet, Take 180 mg by mouth daily, Disp: , Rfl:     hydrocortisone 2.5 % cream, , Disp: , Rfl:     ketoconazole (NIZORAL) 2 % cream, , Disp: , Rfl:     latanoprost (XALATAN) 0.005 % ophthalmic solution, 1 drop daily at bedtime, Disp: , Rfl:     losartan (COZAAR) 50 mg tablet, 100 mg every morning, Disp: , Rfl:     montelukast (SINGULAIR) 10 mg tablet, Take 10 mg by mouth daily, Disp: , Rfl:     rosuvastatin (CRESTOR) 5 mg tablet, Take 5 mg by mouth daily, Disp: , Rfl:     Synthroid 88 MCG tablet, , Disp: , Rfl:     ALLERGIES:  Allergies   Allergen Reactions    Penicillins Rash     REVIEW OF SYSTEMS:  Pertinent items are noted in HPI.  A comprehensive review of systems was negative.  _____________________________________________________  PHYSICAL EXAMINATION:  General: well developed and well nourished, alert, oriented times 3, and appears comfortable  Psychiatric: Normal  HEENT:  Normocephalic,  atraumatic  Cardiovascular:  Regular  Pulmonary: No wheezing or stridor  Skin: No masses, erthema, lacerations, fluctation, ulcerations  Neurovascular: Grossly intact  MUSCULOSKELETAL EXAMINATION:    Left lower extremity notes resolving ecchymosis which is turning to a yellow-green discoloration about the posterior medial aspect of left thigh and extending down towards the calf region.  Pain-free flexion extension of the knee.  She has no pain in the hamstring with full extension of the knee.  Upon strength testing her hams and quads strength are grossly symmetric 5/5.  She has grossly symmetric plantarflexion and dorsiflexion of the ankles bilaterally.  The left calf is not swollen there are no palpable cords but there is tenderness in both the medial and lateral heads to palpation.  He is otherwise grossly neurovascularly intact.  Lower extremity is faint patellofemoral crepitation with range of motion.  She notes some medial compartment discomfort to palpation.  Is able to rise from a chair in the seated position without gross difficulty or pain.  She does have some pain with palpation over the greater trochanter and the proximal IT band area.  There is no groin pain with internal and external range of motion.  She is otherwise grossly neurovascularly intact.  _____________________________________________________  STUDIES REVIEWED:  No new studies to review    PROCEDURES PERFORMED:  None today as patient deferred injection of the right greater troches bursal area.  She also did not want any knee injections which is very reasonable.    Tarun Pelayo PA-C

## 2024-03-13 NOTE — PATIENT INSTRUCTIONS
Although the exam findings are low for DVT the patient has concerns about this due to her strong family history.  Patient was encouraged to start an aspirin 325 mg twice a day.  We can order a left lower extremity Doppler ultrasound as a nonemergent test and have results go to the PCP.  Patient will continue with her range of motion and icing and Tylenol General pain.  Can evaluate the patient back in 2 to 3 weeks for general follow-up.  Patient was shown gentle stretching exercises for the IT band as well as ice and avoidance of laying on that side is much as possible.

## 2024-03-22 ENCOUNTER — HOSPITAL ENCOUNTER (OUTPATIENT)
Dept: NON INVASIVE DIAGNOSTICS | Facility: HOSPITAL | Age: 69
Discharge: HOME | End: 2024-03-22
Payer: COMMERCIAL

## 2024-03-22 DIAGNOSIS — M79.662 PAIN OF LEFT CALF: ICD-10-CM

## 2024-03-22 PROCEDURE — 93971 EXTREMITY STUDY: CPT | Performed by: SURGERY

## 2024-03-22 PROCEDURE — 93971 EXTREMITY STUDY: CPT

## 2024-03-25 ENCOUNTER — TELEPHONE (OUTPATIENT)
Dept: OBGYN CLINIC | Facility: CLINIC | Age: 69
End: 2024-03-25

## 2024-03-25 NOTE — TELEPHONE ENCOUNTER
Spoke to patient and confirmed the Doppler was negative for blood clot.  Recommended to continue aspirin until completely mobile at normal function and then may be discontinued.

## 2024-04-05 RX ORDER — AZITHROMYCIN 250 MG/1
TABLET, FILM COATED ORAL
COMMUNITY
Start: 2024-03-21

## 2024-04-08 ENCOUNTER — OFFICE VISIT (OUTPATIENT)
Dept: OBGYN CLINIC | Facility: CLINIC | Age: 69
End: 2024-04-08
Payer: COMMERCIAL

## 2024-04-08 ENCOUNTER — HOSPITAL ENCOUNTER (OUTPATIENT)
Dept: RADIOLOGY | Facility: CLINIC | Age: 69
Discharge: HOME | End: 2024-04-08
Payer: COMMERCIAL

## 2024-04-08 VITALS
WEIGHT: 195 LBS | DIASTOLIC BLOOD PRESSURE: 89 MMHG | BODY MASS INDEX: 34.55 KG/M2 | HEIGHT: 63 IN | HEART RATE: 76 BPM | SYSTOLIC BLOOD PRESSURE: 150 MMHG | OXYGEN SATURATION: 98 % | TEMPERATURE: 97.6 F

## 2024-04-08 DIAGNOSIS — M79.644 THUMB PAIN, RIGHT: ICD-10-CM

## 2024-04-08 DIAGNOSIS — M54.41 CHRONIC RIGHT-SIDED LOW BACK PAIN WITH RIGHT-SIDED SCIATICA: ICD-10-CM

## 2024-04-08 DIAGNOSIS — G89.29 CHRONIC RIGHT-SIDED LOW BACK PAIN WITH RIGHT-SIDED SCIATICA: Primary | ICD-10-CM

## 2024-04-08 DIAGNOSIS — S70.12XA CONTUSION OF LEFT THIGH, INITIAL ENCOUNTER: ICD-10-CM

## 2024-04-08 DIAGNOSIS — M54.41 CHRONIC RIGHT-SIDED LOW BACK PAIN WITH RIGHT-SIDED SCIATICA: Primary | ICD-10-CM

## 2024-04-08 DIAGNOSIS — G89.29 CHRONIC RIGHT-SIDED LOW BACK PAIN WITH RIGHT-SIDED SCIATICA: ICD-10-CM

## 2024-04-08 PROCEDURE — 99214 OFFICE O/P EST MOD 30 MIN: CPT | Performed by: ORTHOPAEDIC SURGERY

## 2024-04-08 PROCEDURE — 73140 X-RAY EXAM OF FINGER(S): CPT

## 2024-04-08 PROCEDURE — 72100 X-RAY EXAM L-S SPINE 2/3 VWS: CPT

## 2024-04-08 NOTE — PATIENT INSTRUCTIONS
Reinforced with the patient that it is very common for movement of the neck to produce auditory clicking sounds in the neck and are normally benign as long as there is no pain associated and lack neural impingementsymptoms.  Patient was offered and accepted physical therapy for lumbar program and follow-up with Dr. Jesus for her low back and radicular type complaints.  She can also do some therapy for the thumb as she appears to have early tenderness clicking but no discrete triggering which may be associated to the cryotherapy that she is having for the wart.  However seen back in the orthopedic office on a as needed basis for her thumb and improved left leg.  She refused cortisone injection for the thumb today but agreed to therapy.  Also reviewed with the patient that her left thigh is getting better and just takes more time as there is no gross tonic defect.

## 2024-04-08 NOTE — PROGRESS NOTES
ASSESSMENT/PLAN:    Diagnoses and all orders for this visit:    Chronic right-sided low back pain with right-sided sciatica  -     XR spine lumbar 2 or 3 views injury; Future  -     Ambulatory Referral to Physical Therapy; Future  -     Ambulatory referral to Spine & Pain Management; Future    Thumb pain, right  -     XR thumb right first digit-min 2v; Future  -     Ambulatory Referral to Physical Therapy; Future    Contusion of left thigh, initial encounter    Other orders  -     azithromycin (ZITHROMAX) 250 mg tablet; TAKE 2 TABLETS BY MOUTH TODAY, THEN TAKE 1 TABLET DAILY FOR 4 DAYS AS DIRECTED (Patient not taking: Reported on 4/8/2024)    Reinforced with the patient that it is very common for movement of the neck to produce auditory clicking sounds in the neck and are normally benign as long as there is no pain associated and lack neural impingementsymptoms.  Patient was offered and accepted physical therapy for lumbar program and follow-up with Dr. Jesus for her low back and radicular type complaints.  She can also do some therapy for the thumb as she appears to have early tenderness clicking but no discrete triggering which may be associated to the cryotherapy that she is having for the wart.  However seen back in the orthopedic office on a as needed basis for her thumb and improved left leg.  She refused cortisone injection for the thumb today but agreed to therapy.  Also reviewed with the patient that her left thigh is getting better and just takes more time as there is no gross tonic defect.    Return Referred to Dr. Jesus, for prn With Ortho.  _____________________________________________________  CHIEF COMPLAINT:  Chief Complaint   Patient presents with    Right Knee - Follow-up    Left Knee - Follow-up    Follow-up     SUBJECTIVE:  Fatmata Kwon is a 68 y.o. year old female who presents for follow up bilateral leg contusions while attempting to get out of the bathtub and slipped on  2/22/2024.  She has a history of knee arthritis.  She feels she is 80% improved.  She notes her swelling has resolved.  She does get some left hamstring and abductor muscle tenderness and on a hard surface.  She denies pain with walking in the left leg.  She submits brings 3 other notations, 1 that she hears clicking sound with motion of her neck which causes no pain.  Distantly she notes some pain in her right low back and gluteal region that radiates down her leg to her ankle.  She notes that this is aggravated with prolonged walking in a grocery store.  She does not know if leaning forward on a grocery cart to resist pain.  She also notes that this pain will sometimes wake her at night or be uncomfortable and lying in bed her legs out straight and usually feels better with her legs bent up some.  She states she was having this type of discomfort prior to her fall the bathtub but seems to be occurring slightly more frequently.  Also she makes note of the fact that for the past 4 days she has had stiffness banding her thumb.  She is also been getting cryotherapy treatment to wart thumb pad.  She denies injury or trauma to her thumb.  She is requesting thumb and back x-rays.    PAST MEDICAL HISTORY:  Past Medical History:   Diagnosis Date    Autoimmune hepatitis (HCC)     Disease of thyroid gland     Hypertension     Osteoarthritis      PAST SURGICAL HISTORY:  Past Surgical History:   Procedure Laterality Date    KNEE SURGERY Bilateral      FAMILY HISTORY:  Family History   Problem Relation Age of Onset    Heart disease Mother     Diabetes Mother     Cancer Father      SOCIAL HISTORY:  Social History     Tobacco Use    Smoking status: Never    Smokeless tobacco: Never   Vaping Use    Vaping status: Never Used   Substance Use Topics    Alcohol use: Not Currently    Drug use: Never     MEDICATIONS:    Current Outpatient Medications:     Ascorbic Acid (vitamin C) 1000 MG tablet, Take by mouth, Disp: , Rfl:      azaTHIOprine (IMURAN) 50 mg tablet, , Disp: , Rfl:     Calcium-Vitamin D-Vitamin K 500-1000-40 MG-UNT-MCG CHEW, Chew, Disp: , Rfl:     Cholecalciferol 25 MCG (1000 UT) tablet, Take 1,000 Units by mouth, Disp: , Rfl:     Cyanocobalamin (B-12) 1000 MCG TABS, Take by mouth daily, Disp: , Rfl:     docusate sodium (COLACE) 100 mg capsule, Take 100 mg by mouth 2 (two) times a day, Disp: , Rfl:     fexofenadine (ALLEGRA) 180 MG tablet, Take 180 mg by mouth daily, Disp: , Rfl:     hydrocortisone 2.5 % cream, , Disp: , Rfl:     ketoconazole (NIZORAL) 2 % cream, , Disp: , Rfl:     latanoprost (XALATAN) 0.005 % ophthalmic solution, 1 drop daily at bedtime, Disp: , Rfl:     losartan (COZAAR) 50 mg tablet, 100 mg every morning, Disp: , Rfl:     montelukast (SINGULAIR) 10 mg tablet, Take 10 mg by mouth daily, Disp: , Rfl:     rosuvastatin (CRESTOR) 5 mg tablet, Take 5 mg by mouth daily, Disp: , Rfl:     Synthroid 88 MCG tablet, , Disp: , Rfl:     azithromycin (ZITHROMAX) 250 mg tablet, TAKE 2 TABLETS BY MOUTH TODAY, THEN TAKE 1 TABLET DAILY FOR 4 DAYS AS DIRECTED (Patient not taking: Reported on 4/8/2024), Disp: , Rfl:     ALLERGIES:  Allergies   Allergen Reactions    Penicillins Rash     REVIEW OF SYSTEMS:  Pertinent items are noted in HPI.  A comprehensive review of systems was negative.  _____________________________________________________  PHYSICAL EXAMINATION:  General: well developed and well nourished, alert, oriented times 3, and appears comfortable  Psychiatric: Normal  HEENT:  Normocephalic, atraumatic  Cardiovascular:  Regular  Pulmonary: No wheezing or stridor  Skin: No masses, erthema, lacerations, fluctation, ulcerations  Neurovascular: Grossly intact.  MUSCULOSKELETAL EXAMINATION:    Left adductor and hamstring area notes very mild discomfort with palpation.  She has no appreciable pain with resisted knee flexion.  With the knees passively brought to full extension she has no left hamstring type pain.    Right  thumb notes an 8 mm round area of brownish discoloration on the palmar pad from previous cryotherapy wart treatment.  No gross motion limitation at current spite of morning stiffness.  There is no active locking.  There is early triggering with tenderness to palpation near the A1 pulley.  No pain with palpation about the first CMC and MCP joints.  There is no gross instability.  There is no swelling redness nor signs of infection.  There is no deformity.      Lumbar spine notes tenderness to palpation the midline of the L3-L5 region.  There is also some discomfort in the right lower paraspinal area and extending into the gluteus muscle.  There is some discomfort it seems to radiate down her leg by history though a it is straight leg raise does not elicit any discomfort.  Her patellar and Achilles reflexes are grossly symmetric 2+/4 touch sensation is intact distally.  Muscle strength quads hamstrings and plantar/dorsi flexors are 5/5 and symmetric bilaterally.  _____________________________________________________  STUDIES REVIEWED:  Previous Dopplers negative for DVT.    I personally viewed x-rays of lumbar spine and right thumb taken today in the office which document significant  anteriolisthesis of L3 on at the L4.  Disc spaces are grossly maintained.   The right hand x-rays note very minimal arthritic changes about the right thumb, but no fracture or dislocation or bony lesions noted.    PROCEDURES PERFORMED:  None today    Tarun Pelayo PA-C

## 2024-04-18 ENCOUNTER — OFFICE VISIT (OUTPATIENT)
Dept: URGENT CARE | Facility: CLINIC | Age: 69
End: 2024-04-18
Payer: COMMERCIAL

## 2024-04-18 ENCOUNTER — EVALUATION (OUTPATIENT)
Dept: PHYSICAL THERAPY | Facility: CLINIC | Age: 69
End: 2024-04-18
Payer: COMMERCIAL

## 2024-04-18 VITALS
WEIGHT: 195 LBS | HEIGHT: 63 IN | RESPIRATION RATE: 16 BRPM | OXYGEN SATURATION: 98 % | DIASTOLIC BLOOD PRESSURE: 86 MMHG | HEART RATE: 96 BPM | BODY MASS INDEX: 34.55 KG/M2 | SYSTOLIC BLOOD PRESSURE: 147 MMHG | TEMPERATURE: 97.8 F

## 2024-04-18 DIAGNOSIS — M54.41 CHRONIC RIGHT-SIDED LOW BACK PAIN WITH RIGHT-SIDED SCIATICA: Primary | ICD-10-CM

## 2024-04-18 DIAGNOSIS — S10.96XA TICK BITE OF NECK, INITIAL ENCOUNTER: Primary | ICD-10-CM
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
## 2024-04-18 DIAGNOSIS — G89.29 CHRONIC RIGHT-SIDED LOW BACK PAIN WITH RIGHT-SIDED SCIATICA: Primary | ICD-10-CM

## 2024-04-18 DIAGNOSIS — M79.644 THUMB PAIN, RIGHT: ICD-10-CM

## 2024-04-18 PROCEDURE — 99213 OFFICE O/P EST LOW 20 MIN: CPT

## 2024-04-18 PROCEDURE — S9083 URGENT CARE CENTER GLOBAL: HCPCS

## 2024-04-18 PROCEDURE — 97161 PT EVAL LOW COMPLEX 20 MIN: CPT

## 2024-04-18 NOTE — PROGRESS NOTES
PT Evaluation     Today's date: 2024  Patient name: Fatmata Kwon  : 1955  MRN: [id number]  Referring provider: Willian Calvillo DO  Dx:   Encounter Diagnosis     ICD-10-CM    1. Chronic right-sided low back pain with right-sided sciatica  M54.41 Ambulatory Referral to Physical Therapy    G89.29       2. Thumb pain, right  M79.644 Ambulatory Referral to Physical Therapy                     Assessment  Assessment details: Fatmata Kwon presents to therapy with complaint of right lower extremity weakness and sensation of of heaviness.  She notes that she doesn't experience much back pain though describes and demonstrates dermatomal/myotomal pattern impairments.  6MWT test completed with good gait velocity for demographics, though began to feel onset of heaviness in R leg. Upon further examination, objective findings detailed below and signs/symptoms consistent with lumbar radicular symptoms.  Impairments: abnormal or restricted ROM, activity intolerance and impaired physical strength    Symptom irritability: lowUnderstanding of Dx/Px/POC: good   Prognosis: good    Goals  STG: 3 weeks  -patient will be able to improve R hamstring flexibility to achieve 80 degrees SLR  -patient will be able to improve 6MWT distance by 150'  -patient will demonstrate symmetrical MMT in LE musculature      LT weeks  -patient will be able to  object form floor with good body mechanics, without exacerbation of symtpoms  -patient will be able to tolerate walking on grade of >4% for >5 minutes with less than 2/10 symptoms  -patient will be able to improve 6MWT distance by 300'  -patient will improve FOTO score to 67, in order to demonstrate improvements in overall function    Plan  Plan details: Reduce pain, improve walking edurance, improve flexibility, improve function  Patient would benefit from: skilled physical therapy  Referral necessary: No  Planned modality interventions: cryotherapy and  thermotherapy: hydrocollator packs  Planned therapy interventions: ADL training, manual therapy, balance/weight bearing training, IASTM, nerve gliding, neuromuscular re-education, strengthening, stretching, therapeutic activities, graded activity, gait training and flexibility  Frequency: 2x week  Duration in weeks: 6  Plan of Care beginning date: 2024  Plan of Care expiration date: 2024  Treatment plan discussed with: patient        Subjective Evaluation    History of Present Illness  Mechanism of injury: States that she noticed around 6 months ago that she had begun experiencing pain in right leg from right ankle to buttocks following prolonged walking. This resulted in a fall on  injuring her L knee, no fractures and only a contusion/strain noted by ortho, per patient. Right leg continues to be causing pain, especially at night, near her right buttocks. When walking from >15-20 minutes, she notes onset of R leg heaviness, proximal > distal.  She notes no back pain.          Not a recurrent problem   Quality of life: fair    Patient Goals  Patient goals for therapy: decreased pain, improved balance, increased strength, independence with ADLs/IADLs, return to sport/leisure activities and increased motion    Pain  Current pain ratin  At best pain ratin  At worst pain ratin  Location: lateral thigh, Right buttocks  Quality: needle-like, sharp and knife-like  Relieving factors: rest and change in position  Aggravating factors: walking and stair climbing  Progression: worsening    Social Support  Steps to enter house: yes  Stairs in house: yes   Lives in: multiple-level home  Lives with: alone    Employment status: not working  Hand dominance: right      Diagnostic Tests  X-ray: abnormal      Objective     Concurrent Complaints  Negative for night pain, disturbed sleep, bladder dysfunction, bowel dysfunction and history of trauma    Static Posture     Shoulders  Rounded.    Tenderness      Lumbar Spine  Tenderness in the spinous process.     Active Range of Motion     Lumbar   Flexion:  WFL  Extension:  WFL  Left lateral flexion:  WFL  Right lateral flexion:  WFL  Left rotation:  WFL  Right rotation:  WFL    Joint Play   Joints within functional limits: T10, T11, T12, L1, L2, L5 and S1     Pain: L3 and L4     Strength/Myotome Testing     Lumbar   Left   Heel walk: normal  Toe walk: normal    Right   Heel walk: normal  Toe walk: normal    Left Ankle/Foot   Dorsiflexion: 5  Plantar flexion: 5    Right Ankle/Foot   Dorsiflexion: 5  Plantar flexion: 5    Additional Strength Details  Right:  Hip flexor: 23.8   Hip abduciton: 24.3  Knee extension: 28.4  Knee flexion:20.1      Left:  Hip flexor:21.4  Hip abduction: 20.8  Knee extension: 24.6  Knee flexion: 20.1    Tests     Lumbar     Left   Negative crossed SLR, quadrant, sural bias and tibial bias.     Right   Negative crossed SLR, quadrant, sural bias and tibial bias.     Additional Tests Details  90/90 SLR    Ambulation     Ambulation: Level Surfaces     Additional Level Surfaces Ambulation Details  6MWT: 1950'   Noted diminished foot clearance and step symmetry as R LE began experiencing reported heaviness              Precautions: n/a     Daily Treatment Diary:      Initial Evaluation Date: 04/18/24  Compliance 4/18                     Visit Number 1                    Re-Eval  IE                 MC   Foto Captured Y                           4/18                     Manual                      Lumbar, piri STM ->                                                                 Ther-Ex                      LTR ->                     bridging ->                     LAQs ->                     SLRs ->                     Hip abd ->                     Hip add ->                                                                                                             Neuro Re-Ed                                                                                                 Ther-Act                                                               Modalities

## 2024-04-18 NOTE — PROGRESS NOTES
"  [facility]        NAME: Fatmata Kwon is a 68 y.o. female  : 1955    MRN: [id number]  DATE: 2024  TIME: 7:35 PM    Assessment and Plan   Tick bite of neck, initial encounter [S10.96XA, W57.XXXA]  1. Tick bite of neck, initial encounter  Foreign body removal        Universal Protocol:  Consent: Verbal consent obtained.  Risks and benefits: risks, benefits and alternatives were discussed  Consent given by: patient  Time out: Immediately prior to procedure a \"time out\" was called to verify the correct patient, procedure, equipment, support staff and site/side marked as required.  Patient understanding: patient states understanding of the procedure being performed  Patient identity confirmed: verbally with patient  Foreign body removal    Date/Time: 2024 7:32 PM    Performed by: YAHAIRA Mae  Authorized by: Arvind Green DO  Body area: skin  General location: head/neck  Location details: neck  Localization method: visualized  Removal mechanism: forceps  Complexity: simple  1 objects recovered.  Objects recovered: tick  Post-procedure assessment: foreign body removed  Patient tolerance: patient tolerated the procedure well with no immediate complications      Tick removed intact with use of forceps tweezers. Tick embedded < 24 hours and not engorged. Defer Lyme Disease Prophylaxis. Encouraged continued supportive measures.  Monitor for symptoms of Lyme Disease. Follow up with PCP in 3-5 days or proceed to emergency department for worsening symptoms.  Patient verbalized understanding of instructions given.       Patient Instructions     Patient Instructions     Tick removed  Standard wound care  Monitor for fever, chills, headache, body aches, joint pain, or rash   Follow up with PCP in 3-5 days.  Proceed to  ER if symptoms worsen.    If tests have been performed at [facility], our office will contact you with results if changes need to be made to the care plan discussed with " you at the visit.  You can review your full results on [facility]'s MyChart.    Tick Bite   AMBULATORY CARE:   What you need to know about a tick bite:  Ticks need to be removed quickly. Most tick bites are not dangerous, but ticks can pass disease or infection when they bite. Diseases include Lyme disease, babesiosis, tularemia, and [place] Spotted Fever.  Signs and symptoms of a tick bite  may develop right away, or weeks or even months after a bite. You may have redness, pain, itching, and swelling near the bite area. Blisters may also develop. You may develop tick paralysis, a temporary condition that causes problems with leg movement. A disease from a tick bite may cause a fever, rash, body aches, or breathing problems.  Seek care immediately if:   You have trouble walking or moving your legs.    You have joint pain, muscle pain, or muscle weakness within 1 month of a tick bite.    You have a fever, chills, headache, or rash.    Call your doctor if:   You cannot remove the tick.    The tick's head is stuck in your skin.    You have questions or concerns about your condition or care.    Treatment:  Treatment for a disease passed during the bite depends on the disease. You may only need medicines to lower a fever or fight a bacterial infection. More serious diseases can cause conditions such as breathing problems that need to be treated in a hospital. The following can help treat symptoms at the bite area:  Apply ice  on your bite for 15 to 20 minutes every hour or as directed. Use an ice pack, or put crushed ice in a plastic bag. Cover it with a towel before you apply it to your skin. Ice helps prevent tissue damage and decreases swelling and pain.    Medicines  help decrease pain, redness, itching, and swelling. You may also need medicine to prevent or fight a bacterial infection. These medicines may be given as a cream, lotion, or pill.    How to remove a tick:  Remove the tick as soon as possible to help  prevent disease or infection. You are less likely to get sick from a tick bite if you remove the tick within 24 hours. Do not use petroleum jelly, nail polish, rubbing alcohol, or heat. These do not work and may be dangerous. Do the following to remove a tick:  First, try a soapy cotton ball. Soak a cotton ball in liquid soap. Cover the tick with the cotton ball for 30 seconds. The tick may come off with the cotton ball when you pull it away.    Use tweezers if the soapy cotton ball does not work. Grasp the tick as close to your skin as possible. Pull the tick straight up and out. Do not touch the tick with your bare hands.    Do not twist or jerk the tick suddenly, because this may break off the tick's head or mouth parts. Do not leave any part of the tick in your skin.    Do not crush or squeeze the tick since its body may be infected with germs. Flush the tick down the toilet.    After the tick is removed, clean the area of the bite with rubbing alcohol. Then wash your hands with soap and water.       Prevent a tick bite:  Ticks live in areas covered by brush and grass. They may even be found in your lawn if you live in certain areas. Outdoor pets can carry ticks inside the house. Ticks can grab onto you or your clothes when you walk by grass or brush. If you go into areas that contain many trees, tall grasses, and underbrush, do the following:  Wear light colored pants and a long-sleeved shirt.  Tuck your pants into your socks or boots. Tuck in your shirt. Wear sleeves that fit close to the skin at your wrists and neck. This will help prevent ticks from crawling through gaps in your clothing and onto your skin. Wear a hat in areas with trees.    Apply insect repellant on your skin.  The insect repellant should contain DEET. Do not put insect repellant on skin that is cut, scratched, or irritated. Always use soap and water to wash the insect repellant off as soon as possible once you are indoors. Do not apply  insect repellant on your child's face or hands.    Spray insect repellant onto your clothes.  Use permethrin spray. This spray kills ticks that crawl on your clothing. Be sure to spray the tops of your boots, bottom of pant legs, and sleeve cuffs. As soon as possible, wash and dry clothing in hot water and high heat.    Check your clothing, hair, and skin for ticks.  Shower within 2 hours of coming indoors. Carefully check the hairline, armpits, neck, and waist. Check your pets and children for ticks. Remove ticks from pets the same way as you remove them from people.    Decrease the risk for ticks in your yard.  Ticks like to live in shady, moist areas. Mow your lawn regularly to keep the grass short. Trim the grass around birdbaths and fences. Cut branches that are overgrown and take them out of the yard. Clear out leaf piles. Stack firewood in a dry, gissell area.    Follow up with your doctor as directed:  Write down your questions so you remember to ask them during your visits.  © Copyright [employer] 2023 Information is for End User's use only and may not be sold, redistributed or otherwise used for commercial purposes.  The above information is an  only. It is not intended as medical advice for individual conditions or treatments. Talk to your doctor, nurse or pharmacist before following any medical regimen to see if it is safe and effective for you.        Chief Complaint     Chief Complaint   Patient presents with    Tick Removal     Has a tic on right hairline on posterior neck         History of Present Illness       68-year-old female with a past medical history significant for hypertension presents for tick bite and removal.  Patient reports working outdoors and felt tick on right side of neck.  Unable to remove at home.  Denies prior history of Lyme disease.  No fever, chills, headache, myalgias, arthralgias, or rash. Tick only embedded for a few hours.         Review of Systems   Review of  Systems   Constitutional:  Negative for chills and fever.   HENT:  Negative for congestion, ear discharge, ear pain, rhinorrhea and sore throat.    Eyes:  Negative for discharge.   Respiratory:  Negative for cough.    Gastrointestinal:  Negative for abdominal pain, diarrhea, nausea and vomiting.   Musculoskeletal:  Negative for arthralgias and myalgias.   Skin:  Negative for color change and wound.   Neurological:  Negative for headaches.         Current Medications       Current Outpatient Medications:     Ascorbic Acid (vitamin C) 1000 MG tablet, Take by mouth, Disp: , Rfl:     azaTHIOprine (IMURAN) 50 mg tablet, , Disp: , Rfl:     Calcium-Vitamin D-Vitamin K 500-1000-40 MG-UNT-MCG CHEW, Chew, Disp: , Rfl:     Cholecalciferol 25 MCG (1000 UT) tablet, Take 1,000 Units by mouth, Disp: , Rfl:     Cyanocobalamin (B-12) 1000 MCG TABS, Take by mouth daily, Disp: , Rfl:     docusate sodium (COLACE) 100 mg capsule, Take 100 mg by mouth 2 (two) times a day, Disp: , Rfl:     fexofenadine (ALLEGRA) 180 MG tablet, Take 180 mg by mouth daily, Disp: , Rfl:     latanoprost (XALATAN) 0.005 % ophthalmic solution, 1 drop daily at bedtime, Disp: , Rfl:     losartan (COZAAR) 50 mg tablet, 100 mg every morning, Disp: , Rfl:     montelukast (SINGULAIR) 10 mg tablet, Take 10 mg by mouth daily, Disp: , Rfl:     rosuvastatin (CRESTOR) 5 mg tablet, Take 5 mg by mouth daily, Disp: , Rfl:     Synthroid 88 MCG tablet, , Disp: , Rfl:     hydrocortisone 2.5 % cream, , Disp: , Rfl:     ketoconazole (NIZORAL) 2 % cream, , Disp: , Rfl:     Current Allergies     Allergies as of 04/18/2024 - Reviewed 04/18/2024   Allergen Reaction Noted    Other Nasal Congestion 04/16/2024    Penicillins Rash 08/18/2014            The following portions of the patient's history were reviewed and updated as appropriate: allergies, current medications, past family history, past medical history, past social history, past surgical history and problem list.     Past  "Medical History:   Diagnosis Date    Autoimmune hepatitis (HCC)     Disease of thyroid gland     Hypertension     Osteoarthritis        Past Surgical History:   Procedure Laterality Date    KNEE SURGERY Bilateral        Family History   Problem Relation Age of Onset    Heart disease Mother     Diabetes Mother     Cancer Father          Medications have been verified.        Objective   /86   Pulse 96   Temp 97.8 °F (36.6 °C)   Resp 16   Ht 5' 3\" (1.6 m)   Wt 88.5 kg (195 lb)   SpO2 98%   BMI 34.54 kg/m²   No LMP recorded. Patient is postmenopausal.       Physical Exam     Physical Exam  Vitals and nursing note reviewed.   Constitutional:       General: She is not in acute distress.     Appearance: She is not toxic-appearing.   HENT:      Head: Normocephalic.      Nose: Nose normal.      Mouth/Throat:      Mouth: Mucous membranes are moist.      Pharynx: Oropharynx is clear.   Eyes:      Conjunctiva/sclera: Conjunctivae normal.   Cardiovascular:      Rate and Rhythm: Normal rate and regular rhythm.      Heart sounds: Normal heart sounds.   Pulmonary:      Effort: Pulmonary effort is normal. No respiratory distress.      Breath sounds: Normal breath sounds. No stridor. No wheezing, rhonchi or rales.   Skin:     General: Skin is warm and dry.          Neurological:      Mental Status: She is alert and oriented to person, place, and time.      Gait: Gait is intact.   Psychiatric:         Mood and Affect: Mood normal.         Behavior: Behavior normal.                   "

## 2024-04-18 NOTE — PATIENT INSTRUCTIONS
Tick removed  Standard wound care  Monitor for fever, chills, headache, body aches, joint pain, or rash   Follow up with PCP in 3-5 days.  Proceed to  ER if symptoms worsen.    If tests have been performed at [facility], our office will contact you with results if changes need to be made to the care plan discussed with you at the visit.  You can review your full results on [facility]'s [facility].    Tick Bite   AMBULATORY CARE:   What you need to know about a tick bite:  Ticks need to be removed quickly. Most tick bites are not dangerous, but ticks can pass disease or infection when they bite. Diseases include Lyme disease, babesiosis, tularemia, and [place] Spotted Fever.  Signs and symptoms of a tick bite  may develop right away, or weeks or even months after a bite. You may have redness, pain, itching, and swelling near the bite area. Blisters may also develop. You may develop tick paralysis, a temporary condition that causes problems with leg movement. A disease from a tick bite may cause a fever, rash, body aches, or breathing problems.  Seek care immediately if:   You have trouble walking or moving your legs.    You have joint pain, muscle pain, or muscle weakness within 1 month of a tick bite.    You have a fever, chills, headache, or rash.    Call your doctor if:   You cannot remove the tick.    The tick's head is stuck in your skin.    You have questions or concerns about your condition or care.    Treatment:  Treatment for a disease passed during the bite depends on the disease. You may only need medicines to lower a fever or fight a bacterial infection. More serious diseases can cause conditions such as breathing problems that need to be treated in a hospital. The following can help treat symptoms at the bite area:  Apply ice  on your bite for 15 to 20 minutes every hour or as directed. Use an ice pack, or put crushed ice in a plastic bag. Cover it with a towel before you apply it to your skin. Ice  helps prevent tissue damage and decreases swelling and pain.    Medicines  help decrease pain, redness, itching, and swelling. You may also need medicine to prevent or fight a bacterial infection. These medicines may be given as a cream, lotion, or pill.    How to remove a tick:  Remove the tick as soon as possible to help prevent disease or infection. You are less likely to get sick from a tick bite if you remove the tick within 24 hours. Do not use petroleum jelly, nail polish, rubbing alcohol, or heat. These do not work and may be dangerous. Do the following to remove a tick:  First, try a soapy cotton ball. Soak a cotton ball in liquid soap. Cover the tick with the cotton ball for 30 seconds. The tick may come off with the cotton ball when you pull it away.    Use tweezers if the soapy cotton ball does not work. Grasp the tick as close to your skin as possible. Pull the tick straight up and out. Do not touch the tick with your bare hands.    Do not twist or jerk the tick suddenly, because this may break off the tick's head or mouth parts. Do not leave any part of the tick in your skin.    Do not crush or squeeze the tick since its body may be infected with germs. Flush the tick down the toilet.    After the tick is removed, clean the area of the bite with rubbing alcohol. Then wash your hands with soap and water.       Prevent a tick bite:  Ticks live in areas covered by brush and grass. They may even be found in your lawn if you live in certain areas. Outdoor pets can carry ticks inside the house. Ticks can grab onto you or your clothes when you walk by grass or brush. If you go into areas that contain many trees, tall grasses, and underbrush, do the following:  Wear light colored pants and a long-sleeved shirt.  Tuck your pants into your socks or boots. Tuck in your shirt. Wear sleeves that fit close to the skin at your wrists and neck. This will help prevent ticks from crawling through gaps in your clothing  and onto your skin. Wear a hat in areas with trees.    Apply insect repellant on your skin.  The insect repellant should contain DEET. Do not put insect repellant on skin that is cut, scratched, or irritated. Always use soap and water to wash the insect repellant off as soon as possible once you are indoors. Do not apply insect repellant on your child's face or hands.    Spray insect repellant onto your clothes.  Use permethrin spray. This spray kills ticks that crawl on your clothing. Be sure to spray the tops of your boots, bottom of pant legs, and sleeve cuffs. As soon as possible, wash and dry clothing in hot water and high heat.    Check your clothing, hair, and skin for ticks.  Shower within 2 hours of coming indoors. Carefully check the hairline, armpits, neck, and waist. Check your pets and children for ticks. Remove ticks from pets the same way as you remove them from people.    Decrease the risk for ticks in your yard.  Ticks like to live in shady, moist areas. Mow your lawn regularly to keep the grass short. Trim the grass around birdbaths and fences. Cut branches that are overgrown and take them out of the yard. Clear out leaf piles. Stack firewood in a dry, gissell area.    Follow up with your doctor as directed:  Write down your questions so you remember to ask them during your visits.  © Copyright [employer] 2023 Information is for End User's use only and may not be sold, redistributed or otherwise used for commercial purposes.  The above information is an  only. It is not intended as medical advice for individual conditions or treatments. Talk to your doctor, nurse or pharmacist before following any medical regimen to see if it is safe and effective for you.

## 2024-04-19 ENCOUNTER — CONSULT (OUTPATIENT)
Dept: PAIN MEDICINE | Facility: CLINIC | Age: 69
End: 2024-04-19
Payer: COMMERCIAL

## 2024-04-19 VITALS
DIASTOLIC BLOOD PRESSURE: 72 MMHG | TEMPERATURE: 97 F | BODY MASS INDEX: 34.2 KG/M2 | HEART RATE: 85 BPM | SYSTOLIC BLOOD PRESSURE: 118 MMHG | WEIGHT: 193 LBS | OXYGEN SATURATION: 95 % | RESPIRATION RATE: 18 BRPM | HEIGHT: 63 IN

## 2024-04-19 DIAGNOSIS — M54.16 LUMBAR RADICULOPATHY: ICD-10-CM

## 2024-04-19 DIAGNOSIS — M54.41 CHRONIC RIGHT-SIDED LOW BACK PAIN WITH RIGHT-SIDED SCIATICA: ICD-10-CM

## 2024-04-19 DIAGNOSIS — G89.29 CHRONIC RIGHT-SIDED LOW BACK PAIN WITH RIGHT-SIDED SCIATICA: ICD-10-CM

## 2024-04-19 DIAGNOSIS — N18.31 STAGE 3A CHRONIC KIDNEY DISEASE (HCC): Primary | ICD-10-CM

## 2024-04-19 PROCEDURE — 99204 OFFICE O/P NEW MOD 45 MIN: CPT | Performed by: ANESTHESIOLOGY

## 2024-04-19 PROCEDURE — G2211 COMPLEX E/M VISIT ADD ON: HCPCS | Performed by: ANESTHESIOLOGY

## 2024-04-19 RX ORDER — GABAPENTIN 300 MG/1
300 CAPSULE ORAL 3 TIMES DAILY
Qty: 90 CAPSULE | Refills: 2 | Status: SHIPPED | OUTPATIENT
Start: 2024-04-19

## 2024-04-19 RX ORDER — LOSARTAN POTASSIUM 100 MG/1
TABLET ORAL
COMMUNITY
Start: 2024-04-15

## 2024-04-19 NOTE — PROGRESS NOTES
Assessment  1. Chronic right-sided low back pain with right-sided sciatica  -     Ambulatory referral to Sp[facility] Pain [facility]    Right-sided lumbar radicular pain in the L5 dermatomal distribution accompanied by pain limited weakness numbness and paresthesias.  Patient has not yet participated with PT. Chronic pain with decreased participation with IADLs over the past 3 months.  Has been taking OTC ibuprofen and tylenol in addition to gabapentin and Robaxin with modest benefit.  5/5 strength bilaterally, positive SLR left-sided. Reflexes 2+.  Additionally there is positive facet loading, R>L. Denies any gait instability, saddle anesthesia. On xray imaging Grade I spondylolithesis L4 on L5. 4-5 and L5-S1 disc space narrowing with lower lumbar facet arthropathy. Risks, benefits alternatives epidural steroid injections thoroughly discussed with patient.  Handouts provided questions answered to patient's satisfaction.  Lifestyle modifications extensively discussed including diet, exercise and weight loss in addition to core strengthening.  Will proceed with multimodal pain therapy plan as noted below:    Plan  -MRI lumbar spine; will f/u result with patent  -gabapentin 300 mg t.i.d. Ordered for patient; counseled regarding sedative effects of taking this medication and provided up titration calendar.  Counseled not to take medication while driving or operating heavy machinery/using stairs  -continue formal physical therapy for right-sided lumbar radiculopathy; Physician directed home exercise plan as per [facility] demonstrated and handouts provided that patient plans to participate with for 1 hour, twice a week for the next 6 weeks.     There are risks associated with opioid medications, including dependence, addiction and tolerance. The patient understands and agrees to use these medications only as prescribed. Potential side effects of the medications include, but are not limited to, constipation, drowsiness,  addiction, impaired judgment and risk of fatal overdose if not taken as prescribed. The patient was warned against driving while taking sedation medications or operating heavy machinery. The patient voiced understanding. Sharing medications is a felony. At this point in time, the patient is showing no signs of addiction, abuse, diversion or suicidal ideation.     [place] Prescription Drug Monitoring Program report was reviewed and was appropriate      Complete risks and benefits including bleeding, infection, tissue reaction, nerve injury and allergic reaction were discussed. The approach was demonstrated using models and literature was provided. Verbal and written consent was obtained.     My impressions and treatment recommendations were discussed in detail with the patient who verbalized understanding and had no further questions.  Discharge instructions were provided. I personally saw and examined the patient and I agree with the above discussed plan of care.    New Medications Ordered This Visit   Medications    losartan (COZAAR) 100 MG tablet       History of Present Illness    Fatmata Kwon is a 68 y.o. female with pmhx of XOL presenting with presenting with chronic lumbar radicular pain in the right L5  dermatomal distribution. Debilitating pain limited weakness numbness and paresthesias accompany the pain. The patient rates the pain at a 7-8/10 accompanied by electric shock-like shooting features and crampy burning pain in the aforementioned dermatomal distributions.  The pain is worse in the mornings as well as the end of the day; exertion such as walking for long periods of time seems to exacerbate the pain. The patient has difficulty with prolonged activity without having debilitating pain.  She tries to maintain an active lifestyle and finds that the current degree of pain seems to compromises her efforts.  The pain significantly impacts independent activities of daily living and contributes  to significant disability.  She has attempted physical therapy with nominal pain relief.  She has not taken naproxen, tylenol or gabapentin. She has never tried epidural steroid injections in the past. She denies any bowel or bladder dysfunction/incontinence, saddle anesthesia or gait instability.    I have personally reviewed and/or updated the patient's past medical history, past surgical history, family history, social history, current medications, allergies, and vital signs today.     Review of Systems   Constitutional:  Positive for activity change.   HENT: Negative.     Eyes: Negative.    Respiratory: Negative.     Cardiovascular: Negative.    Gastrointestinal: Negative.    Endocrine: Negative.    Genitourinary: Negative.    Musculoskeletal:  Positive for arthralgias, back pain, gait problem and myalgias.   Skin: Negative.    Allergic/Immunologic: Negative.    Neurological:  Positive for weakness and numbness.   Hematological: Negative.    Psychiatric/Behavioral: Negative.     All other systems reviewed and are negative.      Patient Active Problem List   Diagnosis    Acute pain of right knee    Contusion of left thigh    Contusion of right knee    Primary osteoarthritis of both knees       Past Medical History:   Diagnosis Date    Autoimmune hepatitis (HCC)     Disease of thyroid gland     Hypertension     Osteoarthritis        Past Surgical History:   Procedure Laterality Date    KNEE SURGERY Bilateral        Family History   Problem Relation Age of Onset    Heart disease Mother     Diabetes Mother     Cancer Father        Social History     Occupational History    Not on file   Tobacco Use    Smoking status: Never    Smokeless tobacco: Never   Vaping Use    Vaping status: Never Used   Substance and Sexual Activity    Alcohol use: Not Currently    Drug use: Never    Sexual activity: Not Currently       Current Outpatient Medications on File Prior to Visit   Medication Sig    Ascorbic Acid (vitamin C) 1000 MG  "tablet Take by mouth    azaTHIOprine (IMURAN) 50 mg tablet     Calcium-Vitamin D-Vitamin K 500-1000-40 MG-UNT-MCG CHEW Chew    Cholecalciferol 25 MCG (1000 UT) tablet Take 1,000 Units by mouth    Cyanocobalamin (B-12) 1000 MCG TABS Take by mouth daily    docusate sodium (COLACE) 100 mg capsule Take 100 mg by mouth 2 (two) times a day    fexofenadine (ALLEGRA) 180 MG tablet Take 180 mg by mouth daily    hydrocortisone 2.5 % cream     ketoconazole (NIZORAL) 2 % cream     latanoprost (XALATAN) 0.005 % ophthalmic solution 1 drop daily at bedtime    losartan (COZAAR) 100 MG tablet     montelukast (SINGULAIR) 10 mg tablet Take 10 mg by mouth daily    rosuvastatin (CRESTOR) 5 mg tablet Take 5 mg by mouth daily    Synthroid 88 MCG tablet     losartan (COZAAR) 50 mg tablet 100 mg every morning (Patient not taking: Reported on 4/19/2024)     No current facility-administered medications on file prior to visit.       Allergies   Allergen Reactions    Other Nasal Congestion    Penicillins Rash         Physical Exam    /72 (BP Location: Left arm, Patient Position: Sitting, Cuff Size: Adult)   Pulse 85   Temp (!) 97 °F (36.1 °C)   Resp 18   Ht 5' 3\" (1.6 m)   Wt 87.5 kg (193 lb)   SpO2 95%   BMI 34.19 kg/m²     Constitutional: normal, well developed, well nourished, alert, in no distress and non-toxic and no overt pain behavior. and obese  Eyes: anicteric  HEENT: grossly intact  Neck: supple, symmetric, trachea midline and no masses   Pulmonary:even and unlabored  Cardiovascular:No edema or pitting edema present  Skin:Normal without rashes or lesions and well hydrated  Psychiatric:Mood and affect appropriate  Neurologic:Cranial Nerves II-XII grossly intact Sensation grossly intact; no clonus negative mancini's. Reflexes 2+ and brisk. SLR positive right sided  Musculoskeletal:normal gait. 5/5 strength bilaterally with AROM in lower extremities. Normal heel toe and tip toe walking. Significant pain with lumbar facet " loading bilaterally and with lateral spine rotation, ttp over lumbar paraspinal muscles, right greater than left.     Imaging    LUMBAR SPINE     INDICATION:   Lumbago with sciatica, right side. Other chronic pain.      COMPARISON:  None.     VIEWS:  XR SPINE LUMBAR 2 OR 3 VIEWS INJURY  Images: 3     FINDINGS:     There are 5 non rib bearing lumbar vertebral bodies.      There is no evidence of acute fracture or destructive osseous lesion.     Grade I spondylolithesis L4 on L5.      L4-5 and L5-S1 disc space narrowing with lower lumbar facet arthropathy.     The pedicles appear intact.     Soft tissues are unremarkable.     IMPRESSION:        No acute osseous abnormality.       Degenerative changes as described.

## 2024-04-19 NOTE — PATIENT INSTRUCTIONS
Gabapentin (By mouth)   Gabapentin (xavier-a-PEN-tin)  Treats seizures and pain caused by shingles.   Brand Name(s): FusePaq Fanatrex, Neurontin   There may be other brand names for this medicine.  When This Medicine Should Not Be Used:   This medicine is not right for everyone. Do not use it if you had an allergic reaction to gabapentin.  How to Use This Medicine:   Capsule, Liquid, Tablet  Take your medicine as directed. Your dose may need to be changed several times to find what works best for you. If you have epilepsy, do not allow more than 12 hours to pass between doses.  Capsule: Swallow the capsule whole with plenty of water. Do not open, crush, or chew it.  Gralise® tablet: Swallow the tablet whole . Do not crush, break, or chew it.  Neurontin® tablet: If you break a tablet into 2 pieces, use the second half as your next dose. Do not use the half-tablet if the whole tablet has been cut or broken after 28 days.  Oral liquid: Measure the oral liquid medicine with a marked measuring spoon, oral syringe, or medicine cup.  This medicine should come with a Medication Guide. Ask your pharmacist for a copy if you do not have one.  Missed dose: Take a dose as soon as you remember. If it is almost time for your next dose, wait until then and take a regular dose. Do not take extra medicine to make up for a missed dose.  Store the medicine in a closed container at room temperature, away from heat, moisture, and direct light. Store the Neurontin® oral liquid in the refrigerator. Do not freeze.  Drugs and Foods to Avoid:   Ask your doctor or pharmacist before using any other medicine, including over-the-counter medicines, vitamins, and herbal products.  Some medicines can affect how gabapentin works. Tell your doctor if you also using hydrocodone or morphine.  If you take an antacid, wait at least 2 hours before you take gabapentin.  Do not drink alcohol while you are using this medicine.  Tell your doctor if you use  anything else that makes you sleepy. Some examples are allergy medicine, narcotic pain medicine, and alcohol. Tell your doctor if you are also using lorazepam, oxycodone, or zolpidem.  Warnings While Using This Medicine:   Tell your doctor if you are pregnant or breastfeeding, or if you have kidney problems (including patients receiving dialysis) or lung problems. Tell your doctor if you have a history of depression or mental health problems.  This medicine may cause the following problems:  Drug reaction with eosinophilia and systemic symptoms (DRESS) or multiorgan hypersensitivity, which may damage the liver, kidney, blood, heart, or muscles  Changes in mood or behavior, including suicidal thoughts or behavior  Respiratory depression (serious breathing problem that can be life-threatening), when used with narcotic pain medicines  Do not stop using this medicine suddenly. Your doctor will need to slowly decrease your dose before you stop it completely.  This medicine may make you dizzy or drowsy. Do not drive or do anything else that could be dangerous until you know how this medicine affects you.  Tell any doctor or dentist who treats you that you are using this medicine. This medicine may affect certain medical test results.  Your doctor will check your progress and the effects of this medicine at regular visits. Keep all appointments.  Keep all medicine out of the reach of children. Never share your medicine with anyone.  Possible Side Effects While Using This Medicine:   Call your doctor right away if you notice any of these side effects:  Allergic reaction: Itching or hives, swelling in your face or hands, swelling or tingling in your mouth or throat, chest tightness, trouble breathing  Behavior problems, aggression, restlessness, trouble concentrating, moodiness (especially in children)  Blistering, peeling, red skin rash  Blue lips, fingernails, or skin, chest pain, fast heartbeat, trouble breathing  Change  in how much or how often you urinate, bloody or cloudy urine  Dark urine or pale stools, nausea, vomiting, loss of appetite, stomach pain, yellow skin or eyes  Fever, chills, cough, sore throat, body aches  Problems with coordination, shakiness, unsteadiness, unusual eye movement  Rapid weight gain, swelling in your hands, ankles, or feet  Rash, swollen or tender glands in the neck, armpit, or groin  Unusual moods or behaviors, thoughts of hurting yourself, feeling depressed  If you notice these less serious side effects, talk with your doctor:   Dizziness, drowsiness, sleepiness, tiredness  If you notice other side effects that you think are caused by this medicine, tell your doctor.   Call your doctor for medical advice about side effects. You may report side effects to FDA at 1-829-FDA-0111  © Copyright [employer] 2023 Information is for End User's use only and may not be sold, redistributed or otherwise used for commercial purposes.  The above information is an  only. It is not intended as medical advice for individual conditions or treatments. Talk to your doctor, nurse or pharmacist before following any medical regimen to see if it is safe and effective for you.  Core Strengthening Exercises   WHAT YOU NEED TO KNOW:   Your core includes the muscles of your lower back, hip, pelvis, and abdomen. Core strengthening exercises help heal and strengthen these muscles. This helps prevent another injury, and keeps your pelvis, spine, and hips in the correct position.  DISCHARGE INSTRUCTIONS:   Call your doctor or physical therapist if:   You have sharp or worsening pain during exercise or at rest.    You have questions or concerns about your shoulder exercises.    Safety tips:  Talk to your healthcare provider before you start an exercise program. A physical therapist can teach you how to do core strengthening exercises safely.  Do the exercises on a mat or firm surface.  A firm surface will support your  spine and prevent low back pain. Do not do these exercises on a bed.    Move slowly and smoothly.  Avoid fast or jerky motions.    Stop if you feel pain.  You may feel some discomfort at first, but you should not feel pain. Tell your provider or physical therapist if you have pain while you exercise. Regular exercise will help decrease your discomfort over time.    Breathe normally during core exercises.  Do not hold your breath. This may cause an increase in blood pressure and prevent muscle strengthening. Your healthcare provider will tell you when to inhale and exhale during the exercise.    Begin all of your exercises with abdominal bracing.  Abdominal bracing helps warm up your core muscles. You can also practice abdominal bracing throughout the day. Lie on your back with your knees bent and feet flat on the floor. Place your arms in a relaxed position beside your body. Tighten your abdominal muscles. Pull your belly button in and up toward your spine. Hold for 5 seconds. Relax your muscles. Repeat 10 times.       Core strengthening exercises:  Your healthcare provider will tell you how often to do these exercises. The provider will also tell you how many repetitions of each exercise you should do. Hold each exercise for 5 seconds or as directed. As you get stronger, increase your hold to 10 to 15 seconds. You can do some of these exercises on a stability ball, or with a weight. Ask your healthcare provider how to use a stability ball or weight for these exercises:  Bridging:  Lie on your back with your knees bent and feet flat on the floor. Rest your arms at your side. Tighten your buttocks, and then lift your hips 1 inch off the floor. Hold for 5 seconds. When you can do this exercise without pain for 10 seconds, increase the distance you lift your hips. A good goal is to be able to lift your hips so that your shoulders, hips, and knees are in a straight line.         Dead bug:  Lie on your back with your  knees bent and feet flat on the floor. Place your arms in a relaxed position beside your body. Begin with abdominal bracing. Next, raise one leg, keeping your knee bent. Hold for 5 seconds. Repeat with the other leg. When you can do this exercise without pain for 10 to 15 seconds, you may raise one straight leg and hold. Repeat with the other leg.         Quadruped:  Place your hands and knees on the floor. Keep your wrists directly below your shoulders and your knees directly below your hips. Pull your belly button in toward your spine. Do not flatten or arch your back. Tighten your abdominal muscles below your belly button. Hold for 5 seconds. When you can do this exercise without pain for 10 to 15 seconds, you may extend one arm and hold. Repeat on the other side.         Side bridge exercises:      Standing side bridge:  Stand next to a wall and extend one arm toward the wall. Place your palm flat on the wall with your fingers pointing upward. Begin with abdominal bracing. Next, without moving your feet, slowly bend your arm to 90 degrees. Hold for 5 seconds. Repeat on the other side. When you can do this exercise without pain for 10 to 15 seconds, you may do the bent leg side bridge on the floor.         Bent leg side bridge:  Lie on one side with your legs, hips, and shoulders in a straight line. Prop yourself up onto your forearm so your elbow is directly below your shoulder. Bend your knees back to 90 degrees. Begin with abdominal bracing. Next, lift your hips and balance yourself on your forearm and knees. Hold for 5 seconds. Repeat on the other side. When you can do this exercise without pain for 10 to 15 seconds, you may do the straight leg side bridge on the floor.         Straight leg side bridge:  Lie on one side with your legs, hips, and shoulders in a straight line. Prop yourself up onto your forearm so your elbow is directly below your shoulder. Begin with abdominal bracing. Lift your hips off the  floor and balance yourself on your forearm and the outside of your flexed foot. Do not let your ankle bend sideways. Hold for 5 seconds. Repeat on the other side. When you can do this exercise without pain for 10 to 15 seconds, ask your healthcare provider for more advanced exercises.       Superman:  Lie on your stomach. Extend your arms forward on the floor. Tighten your abdominal muscles and lift your right hand and left leg off the floor. Hold this position. Slowly return to the starting position. Tighten your abdominal muscles and lift your left hand and right leg off the floor. Hold this position. Slowly return to the starting position.         Clam:  Lie on your side with your knees bent. Put your bottom arm under your head to keep your neck in line. Put your top hand on your hip to keep your pelvis from moving. Put your heels together, and keep them together during this exercise. Slowly raise your top knee toward the ceiling. Then lower your leg so your knees are together. Repeat this exercise 10 times. Then switch sides and do the exercise 10 times with the other leg.         Curl up:  Lie on your back with your knees bent and feet flat on the floor. Place your hands, palms down, underneath your lower back. Next, with your elbows on the floor, lift your shoulders and chest 2 to 3 inches off the floor. Keep your head in line with your shoulders. Hold this position. Slowly return to the starting position.         Straight leg raises:  Lie on your back with one leg straight. Bend the other knee and place your foot flat on the floor. Tighten your abdominal muscles. Keep your leg straight and slowly lift it straight up 6 to 12 inches off the floor. Hold this position. Lower your leg slowly. Do as many repetitions as directed on this side. Repeat with the other leg.         Plank:  Lie on your stomach. Bend your elbows and place your forearms flat on the floor. Lift your chest, stomach, and knees off the floor.  Make sure your elbows are below your shoulders. Your body should be in a straight line. Do not let your hips or lower back sink to the ground. Squeeze your abdominal muscles together and hold for 15 seconds. To make this exercise harder, hold for 30 seconds or lift 1 leg at a time.         Bicycles:  Lie on your back. Bend both knees and bring them toward your chest. Your calves should be parallel to the floor. Place the palms of your hands on the back of your head. Straighten your right leg and keep it lifted 2 inches off the floor. Raise your head and shoulders off the floor and twist towards your left. Keep your head and shoulders lifted. Bend your right knee while you straighten your left leg. Keep your left leg 2 inches off the floor. Twist your head and chest towards the left leg. Continue to straighten 1 leg at a time and twist.       Follow up with your doctor or physical therapist as directed:  Write down your questions so you remember to ask them during your visits.  © Copyright [employer] 2023 Information is for End User's use only and may not be sold, redistributed or otherwise used for commercial purposes.  The above information is an  only. It is not intended as medical advice for individual conditions or treatments. Talk to your doctor, nurse or pharmacist before following any medical regimen to see if it is safe and effective for you.

## 2024-04-19 NOTE — LETTER
April 19, 2024       No Recipients    Patient: Fatmata Kwon   YOB: 1955   Date of Visit: 4/19/2024       Dear Dr. Villanueva:    Thank you for referring Fatmata Kwon to me for evaluation. Below are my notes for this consultation.    If you have questions, please do not hesitate to call me. I look forward to following your patient along with you.         Sincerely,        Alexander Jesus MD        CC:   No Recipients    Alexander Jesus MD  4/19/2024  2:07 PM  Signed      Assessment  1. Chronic right-sided low back pain with right-sided sciatica  -     Ambulatory referral to Spine & Pain Management    Right-sided lumbar radicular pain in the L5 dermatomal distribution accompanied by pain limited weakness numbness and paresthesias.  Patient has not yet participated with PT. Chronic pain with decreased participation with IADLs over the past 3 months.  Has been taking OTC ibuprofen and tylenol in addition to gabapentin and Robaxin with modest benefit.  5/5 strength bilaterally, positive SLR left-sided. Reflexes 2+.  Additionally there is positive facet loading, R>L. Denies any gait instability, saddle anesthesia. On xray imaging Grade I spondylolithesis L4 on L5. 4-5 and L5-S1 disc space narrowing with lower lumbar facet arthropathy. Risks, benefits alternatives epidural steroid injections thoroughly discussed with patient.  Handouts provided questions answered to patient's satisfaction.  Lifestyle modifications extensively discussed including diet, exercise and weight loss in addition to core strengthening.  Will proceed with multimodal pain therapy plan as noted below:    Plan  -MRI lumbar spine; will f/u result with patent  -gabapentin 300 mg t.i.d. Ordered for patient; counseled regarding sedative effects of taking this medication and provided up titration calendar.  Counseled not to take medication while driving or operating heavy machinery/using stairs  -continue formal physical  therapy for right-sided lumbar radiculopathy; Physician directed home exercise plan as per [employer] demonstrated and handouts provided that patient plans to participate with for 1 hour, twice a week for the next 6 weeks.     There are risks associated with opioid medications, including dependence, addiction and tolerance. The patient understands and agrees to use these medications only as prescribed. Potential side effects of the medications include, but are not limited to, constipation, drowsiness, addiction, impaired judgment and risk of fatal overdose if not taken as prescribed. The patient was warned against driving while taking sedation medications or operating heavy machinery. The patient voiced understanding. Sharing medications is a felony. At this point in time, the patient is showing no signs of addiction, abuse, diversion or suicidal ideation.     [place] Prescription Drug Monitoring Program report was reviewed and was appropriate      Complete risks and benefits including bleeding, infection, tissue reaction, nerve injury and allergic reaction were discussed. The approach was demonstrated using models and literature was provided. Verbal and written consent was obtained.     My impressions and treatment recommendations were discussed in detail with the patient who verbalized understanding and had no further questions.  Discharge instructions were provided. I personally saw and examined the patient and I agree with the above discussed plan of care.    New Medications Ordered This Visit   Medications   • losartan (COZAAR) 100 MG tablet       History of Present Illness    Fatmata Kwon is a 68 y.o. female with pmhx of XOL presenting with presenting with chronic lumbar radicular pain in the right L5  dermatomal distribution. Debilitating pain limited weakness numbness and paresthesias accompany the pain. The patient rates the pain at a 7-8/10 accompanied by electric shock-like shooting features and  crampy burning pain in the aforementioned dermatomal distributions.  The pain is worse in the mornings as well as the end of the day; exertion such as walking for long periods of time seems to exacerbate the pain. The patient has difficulty with prolonged activity without having debilitating pain.  She tries to maintain an active lifestyle and finds that the current degree of pain seems to compromises her efforts.  The pain significantly impacts independent activities of daily living and contributes to significant disability.  She has attempted physical therapy with nominal pain relief.  She has not taken naproxen, tylenol or gabapentin. She has never tried epidural steroid injections in the past. She denies any bowel or bladder dysfunction/incontinence, saddle anesthesia or gait instability.    I have personally reviewed and/or updated the patient's past medical history, past surgical history, family history, social history, current medications, allergies, and vital signs today.     Review of Systems   Constitutional:  Positive for activity change.   HENT: Negative.     Eyes: Negative.    Respiratory: Negative.     Cardiovascular: Negative.    Gastrointestinal: Negative.    Endocrine: Negative.    Genitourinary: Negative.    Musculoskeletal:  Positive for arthralgias, back pain, gait problem and myalgias.   Skin: Negative.    Allergic/Immunologic: Negative.    Neurological:  Positive for weakness and numbness.   Hematological: Negative.    Psychiatric/Behavioral: Negative.     All other systems reviewed and are negative.      Patient Active Problem List   Diagnosis   • Acute pain of right knee   • Contusion of left thigh   • Contusion of right knee   • Primary osteoarthritis of both knees       Past Medical History:   Diagnosis Date   • Autoimmune hepatitis (HCC)    • Disease of thyroid gland    • Hypertension    • Osteoarthritis        Past Surgical History:   Procedure Laterality Date   • KNEE SURGERY Bilateral   "      Family History   Problem Relation Age of Onset   • Heart disease Mother    • Diabetes Mother    • Cancer Father        Social History     Occupational History   • Not on file   Tobacco Use   • Smoking status: Never   • Smokeless tobacco: Never   Vaping Use   • Vaping status: Never Used   Substance and Sexual Activity   • Alcohol use: Not Currently   • Drug use: Never   • Sexual activity: Not Currently       Current Outpatient Medications on File Prior to Visit   Medication Sig   • Ascorbic Acid (vitamin C) 1000 MG tablet Take by mouth   • azaTHIOprine (IMURAN) 50 mg tablet    • Calcium-Vitamin D-Vitamin K 500-1000-40 MG-UNT-MCG CHEW Chew   • Cholecalciferol 25 MCG (1000 UT) tablet Take 1,000 Units by mouth   • Cyanocobalamin (B-12) 1000 MCG TABS Take by mouth daily   • docusate sodium (COLACE) 100 mg capsule Take 100 mg by mouth 2 (two) times a day   • fexofenadine (ALLEGRA) 180 MG tablet Take 180 mg by mouth daily   • hydrocortisone 2.5 % cream    • ketoconazole (NIZORAL) 2 % cream    • latanoprost (XALATAN) 0.005 % ophthalmic solution 1 drop daily at bedtime   • losartan (COZAAR) 100 MG tablet    • montelukast (SINGULAIR) 10 mg tablet Take 10 mg by mouth daily   • rosuvastatin (CRESTOR) 5 mg tablet Take 5 mg by mouth daily   • Synthroid 88 MCG tablet    • losartan (COZAAR) 50 mg tablet 100 mg every morning (Patient not taking: Reported on 4/19/2024)     No current facility-administered medications on file prior to visit.       Allergies   Allergen Reactions   • Other Nasal Congestion   • Penicillins Rash         Physical Exam    /72 (BP Location: Left arm, Patient Position: Sitting, Cuff Size: Adult)   Pulse 85   Temp (!) 97 °F (36.1 °C)   Resp 18   Ht 5' 3\" (1.6 m)   Wt 87.5 kg (193 lb)   SpO2 95%   BMI 34.19 kg/m²     Constitutional: normal, well developed, well nourished, alert, in no distress and non-toxic and no overt pain behavior. and obese  Eyes: anicteric  HEENT: grossly intact  Neck: " supple, symmetric, trachea midline and no masses   Pulmonary:even and unlabored  Cardiovascular:No edema or pitting edema present  Skin:Normal without rashes or lesions and well hydrated  Psychiatric:Mood and affect appropriate  Neurologic:Cranial Nerves II-XII grossly intact Sensation grossly intact; no clonus negative mancini's. Reflexes 2+ and brisk. SLR positive right sided  Musculoskeletal:normal gait. 5/5 strength bilaterally with AROM in lower extremities. Normal heel toe and tip toe walking. Significant pain with lumbar facet loading bilaterally and with lateral spine rotation, ttp over lumbar paraspinal muscles, right greater than left.     Imaging    LUMBAR SPINE     INDICATION:   Lumbago with sciatica, right side. Other chronic pain.      COMPARISON:  None.     VIEWS:  XR SPINE LUMBAR 2 OR 3 VIEWS INJURY  Images: 3     FINDINGS:     There are 5 non rib bearing lumbar vertebral bodies.      There is no evidence of acute fracture or destructive osseous lesion.     Grade I spondylolithesis L4 on L5.      L4-5 and L5-S1 disc space narrowing with lower lumbar facet arthropathy.     The pedicles appear intact.     Soft tissues are unremarkable.     IMPRESSION:        No acute osseous abnormality.       Degenerative changes as described.

## 2024-04-24 ENCOUNTER — OFFICE VISIT (OUTPATIENT)
Dept: PHYSICAL THERAPY | Facility: CLINIC | Age: 69
End: 2024-04-24
Payer: COMMERCIAL

## 2024-04-24 DIAGNOSIS — G89.29 CHRONIC RIGHT-SIDED LOW BACK PAIN WITH RIGHT-SIDED SCIATICA: Primary | ICD-10-CM

## 2024-04-24 DIAGNOSIS — M54.41 CHRONIC RIGHT-SIDED LOW BACK PAIN WITH RIGHT-SIDED SCIATICA: Primary | ICD-10-CM

## 2024-04-24 DIAGNOSIS — M79.644 THUMB PAIN, RIGHT: ICD-10-CM

## 2024-04-24 PROCEDURE — 97110 THERAPEUTIC EXERCISES: CPT

## 2024-04-24 PROCEDURE — 97140 MANUAL THERAPY 1/> REGIONS: CPT

## 2024-04-24 NOTE — PROGRESS NOTES
Daily Note     Today's date: 2024  Patient name: Fatmata Kwon  : 1955  MRN: [id number]  Referring provider: Willian Calvillo DO  Dx:   Encounter Diagnosis     ICD-10-CM    1. Chronic right-sided low back pain with right-sided sciatica  M54.41     G89.29       2. Thumb pain, right  M79.644           Start Time: 0810  Stop Time: 0900  Total time in clinic (min): 50 minutes    Subjective: Jasmin states that since last week she feels a slight improvement in her right leg function and walking during activities. Also notes that over the weekend she began experiencing pain along the inside of her knee. She is unable to recall a specific mechanism of injury or event resulting in pain. States that she had seen her doctor yesterday who performed x rays, though results not in yet.      Objective: See treatment diary below      Assessment: Tolerated treatment well. Patient demonstrated fatigue post treatment and exhibited good technique with therapeutic exercises. Due to new complaint of knee pain with pain along medial aspect of knee, PT examined knee ligaments without any marked laxity, appear to be symmetrical across both knees this date. Does not some posteromedial knee pain with deep knee flexion and pressure. Advised to continue monitoring symptoms  during activities over the next few days. Tolerate progression of core stabilization program well without an exacerbation of symtpoms. Would continue to benefit from skilled therapy services in order to maximize function,      Plan: Continue per plan of care.      Precautions: n/a     Daily Treatment Diary:      Initial Evaluation Date: 24  Compliance                    Visit Number 1 2                   Re-Eval  IE                 MC   Foto Captured Y                                              Manual                      Lumbar, piri STM ->  8'                                                               Ther-Ex                    "   Stretching: h/s/ piriformis  4x30\"           LTR ->  10x5\"                   bridging ->  3x10                   LAQs ->  3x10 2#                   SLRs ->  3x10 to 60 degrees                  Hip abd ->  5\"/5\" 2'                   Hip add ->  5\"/5\" 2'                                         Mini squats   20x                   Standing hip 3 ways   2x10                                         Neuro Re-Ed                                                                                                Ther-Act                                                               Modalities                                                                                    "

## 2024-04-26 ENCOUNTER — OFFICE VISIT (OUTPATIENT)
Dept: PHYSICAL THERAPY | Facility: CLINIC | Age: 69
End: 2024-04-26
Payer: COMMERCIAL

## 2024-04-26 DIAGNOSIS — G89.29 CHRONIC RIGHT-SIDED LOW BACK PAIN WITH RIGHT-SIDED SCIATICA: Primary | ICD-10-CM

## 2024-04-26 DIAGNOSIS — M79.644 THUMB PAIN, RIGHT: ICD-10-CM

## 2024-04-26 DIAGNOSIS — M54.41 CHRONIC RIGHT-SIDED LOW BACK PAIN WITH RIGHT-SIDED SCIATICA: Primary | ICD-10-CM

## 2024-04-26 PROCEDURE — 97140 MANUAL THERAPY 1/> REGIONS: CPT

## 2024-04-26 PROCEDURE — 97110 THERAPEUTIC EXERCISES: CPT

## 2024-04-26 NOTE — PROGRESS NOTES
"Daily Note     Today's date: 2024  Patient name: Fatmata Kwon  : 1955  MRN: [id number]  Referring provider: Willian Calvillo DO  Dx:   Encounter Diagnosis     ICD-10-CM    1. Chronic right-sided low back pain with right-sided sciatica  M54.41     G89.29       2. Thumb pain, right  M79.644           Start Time: 0810  Stop Time: 0900  Total time in clinic (min): 50 minutes    Subjective: Jasmin states that her R leg had been feeling pretty good until this morning when she experienced some right buttocks pain. Though she does say she completed a lot of chores in and out of the house yesterday. Feels a mild improvement regarding the heaviness in her legs.       Objective: See treatment diary below      Assessment: Tolerated treatment well. Patient demonstrated fatigue post treatment and exhibited good technique with therapeutic exercises. Progressed core stabilization program with iso holds in various positions while standing and completing dynamic activities. Would cont. To benefit from skilled therapy services in order to maximize function.      Plan: Continue per plan of care.      Precautions: n/a     Daily Treatment Diary:      Initial Evaluation Date: 24  Compliance                  Visit Number 1 2  3                 Re-Eval  IE                 MC   Foto Captured Y                                            Manual                      Lumbar, piri STM ->  8'  8'                                                             Ther-Ex                      nustep   L3 67'                       Stretching: h/s/ piriformis  4x30\" 4x30\" ea          LTR ->  10x5\"  10x5\"                 bridging ->  3x10  3x10                 LAQs ->  3x10 2#  3x10 2#                 SLRs ->  3x10 to 60 degrees 3x10 to 60 degrees  add 2#               Hip abd ->  5\"/5\" 2'  5\"/5\" 2'                 Hip add ->  5\"/5\" 2'  5\"/5\" 2'                                       Mini squats   20x  " 20x                 Standing hip 3 ways   2x10  2x10                                       Neuro Re-Ed                      Standing march c TRA iso hold     3x10 ea 2#                 Step up c TRA hold   2x10 ea 2#                                                 Ther-Act                                                               Modalities

## 2024-04-29 ENCOUNTER — OFFICE VISIT (OUTPATIENT)
Dept: PHYSICAL THERAPY | Facility: CLINIC | Age: 69
End: 2024-04-29
Payer: COMMERCIAL

## 2024-04-29 DIAGNOSIS — M79.644 THUMB PAIN, RIGHT: ICD-10-CM

## 2024-04-29 DIAGNOSIS — M54.41 CHRONIC RIGHT-SIDED LOW BACK PAIN WITH RIGHT-SIDED SCIATICA: Primary | ICD-10-CM

## 2024-04-29 DIAGNOSIS — G89.29 CHRONIC RIGHT-SIDED LOW BACK PAIN WITH RIGHT-SIDED SCIATICA: Primary | ICD-10-CM

## 2024-04-29 PROCEDURE — 97110 THERAPEUTIC EXERCISES: CPT

## 2024-04-29 PROCEDURE — 97140 MANUAL THERAPY 1/> REGIONS: CPT

## 2024-04-29 NOTE — PROGRESS NOTES
"Daily Note     Today's date: 2024  Patient name: Fatmata Kwon  : 1955  MRN: [id number]  Referring provider: Willian Calvillo DO  Dx:   Encounter Diagnosis     ICD-10-CM    1. Chronic right-sided low back pain with right-sided sciatica  M54.41     G89.29       2. Thumb pain, right  M79.644           Start Time: 0730  Stop Time: 08  Total time in clinic (min): 50 minutes    Subjective: Patient notes irritation of symptoms following working with weights and yard work.       Objective: See treatment diary below      Assessment: Tolerated treatment well. Patient would benefit from continued PT. She notes some slight pulling with several exercises but no pain or discomfort. Would cont to benefit from progression of program and skilled therapy services in order to maximize function.      Plan: Continue per plan of care.      Precautions: n/a     Daily Treatment Diary:      Initial Evaluation Date: 24  Compliance                Visit Number 1 2  3  4               Re-Eval  IE                 [facility]                                          Manual                      Lumbar, piri STM ->  8'  8'  -                                                           Ther-Ex                      nustep   L3 7' L4 6 min                      Stretching: h/s/ piriformis  4x30\" 4x30\" ea 4x30\" ea         LTR ->  10x5\"  10x5\"  10x5\"               bridging ->  3x10  3x10  3x10               LAQs ->  3x10 2#  3x10 2#  3x10 2#               SLRs ->  3x10 to 60 degrees 3x10 to 60 degrees  add 2#  3x10               Hip abd ->  5\"/5\" 2'  5\"/5\" 2'  5\"/5\" 2               Hip add ->  5\"/5\" 2'  5\"/5\" 2'  5\"/5\" 2                                     Mini squats   20x  20x  20x               Standing hip 3 ways   2x10  2x10  2x10 2#                                     Neuro Re-Ed                      Standing march c TRA iso hold     3x10 ea 2#  3x10 ea 2#             "   Step up c TRA hold   2x10 ea 2# 2x10 ea 2#                                                Ther-Act                                                               Modalities                                                                                         vomiting/nausea

## 2024-05-01 ENCOUNTER — OFFICE VISIT (OUTPATIENT)
Dept: PHYSICAL THERAPY | Facility: CLINIC | Age: 69
End: 2024-05-01
Payer: COMMERCIAL

## 2024-05-01 DIAGNOSIS — M54.41 CHRONIC RIGHT-SIDED LOW BACK PAIN WITH RIGHT-SIDED SCIATICA: Primary | ICD-10-CM

## 2024-05-01 DIAGNOSIS — M79.644 THUMB PAIN, RIGHT: ICD-10-CM

## 2024-05-01 DIAGNOSIS — G89.29 CHRONIC RIGHT-SIDED LOW BACK PAIN WITH RIGHT-SIDED SCIATICA: Primary | ICD-10-CM

## 2024-05-01 PROCEDURE — 97110 THERAPEUTIC EXERCISES: CPT

## 2024-05-01 PROCEDURE — 97112 NEUROMUSCULAR REEDUCATION: CPT

## 2024-05-01 NOTE — PROGRESS NOTES
"Daily Note     Today's date: 2024  Patient name: Fatmata Kwon  : 1955  MRN: [id number]  Referring provider: Willian Calvillo DO  Dx:   Encounter Diagnosis     ICD-10-CM    1. Chronic right-sided low back pain with right-sided sciatica  M54.41     G89.29       2. Thumb pain, right  M79.644           Start Time: 825  Stop Time: 920  Total time in clinic (min): 55 minutes    Subjective: Jasmin states that her thumb has been responding to targeted ROM exercises, helping diminish morning stiffness.  Reports that she feels she is still benefiting from therapy and would likely to continue      Objective: See treatment diary below      Assessment: Tolerated treatment well. Patient demonstrated fatigue post treatment and exhibited good technique with therapeutic exercises. Able to further progress overall volume of exercise while maintaining good form/technique. Continues to report feeling weaker in R leg vs right. Would cont to benefit from skilled therapy services in order to maximize function.      Plan: Continue per plan of care.      Precautions: n/a     Daily Treatment Diary:      Initial Evaluation Date: 24  Compliance              Visit Number 1 2  3  4  5             Re-Eval  IE                 MC   Foto Captured Y                                        Manual                      Lumbar, piri STM ->  8'  8'  -                                                           Ther-Ex                      nustep   L3 7' L4 6 min L4 6'                     Stretching: h/s/ piriformis  4x30\" 4x30\" ea 4x30\" ea 4x30\"        LTR ->  10x5\"  10x5\"  10x5\"  10x5\"             bridging ->  3x10  3x10  3x10  3x10             LAQs ->  3x10 2#  3x10 2#  3x10 2#  3x10             SLRs ->  3x10 to 60 degrees 3x10 to 60 degrees  add 2#  3x10  3x10 2#             Hip abd ->  5\"/5\" 2'  5\"/5\" 2'  5\"/5\" 2  5\"/5\" 2'             Hip add ->  5\"/5\" 2'  5\"/5\" 2'  5\"/5\" 2  " "5\"/5\" 2'                                   Mini squats   20x  20x  20x  20x             Standing hip 3 ways   2x10  2x10  2x10 2#  2x10 2#                                   Neuro Re-Ed                      Standing march c TRA iso hold     3x10 ea 2#  3x10 ea 2#  3x10 ea 2#             Step up c TRA hold   2x10 ea 2# 2x10 ea 2# 2x10 2#        Paloff press    Double blue  10xea Double blue 15x        Paloff c rotation    Double blue 10x ea Double blue 15x        Med ball toss sideways     ->                     Ther-Act                                                               Modalities                                                                                          "

## 2024-05-03 ENCOUNTER — APPOINTMENT (OUTPATIENT)
Dept: PHYSICAL THERAPY | Facility: CLINIC | Age: 69
End: 2024-05-03
Payer: COMMERCIAL

## 2024-05-03 ENCOUNTER — HOSPITAL ENCOUNTER (OUTPATIENT)
Dept: MRI IMAGING | Facility: HOSPITAL | Age: 69
Discharge: HOME | End: 2024-05-03
Attending: ANESTHESIOLOGY
Payer: COMMERCIAL

## 2024-05-03 DIAGNOSIS — G89.29 CHRONIC RIGHT-SIDED LOW BACK PAIN WITH RIGHT-SIDED SCIATICA: ICD-10-CM

## 2024-05-03 DIAGNOSIS — M54.41 CHRONIC RIGHT-SIDED LOW BACK PAIN WITH RIGHT-SIDED SCIATICA: ICD-10-CM

## 2024-05-03 PROCEDURE — 72148 MRI LUMBAR SPINE W/O DYE: CPT

## 2024-05-08 ENCOUNTER — OFFICE VISIT (OUTPATIENT)
Dept: PHYSICAL THERAPY | Facility: CLINIC | Age: 69
End: 2024-05-08
Payer: COMMERCIAL

## 2024-05-08 ENCOUNTER — TELEPHONE (OUTPATIENT)
Dept: PAIN MEDICINE | Facility: CLINIC | Age: 69
End: 2024-05-08

## 2024-05-08 DIAGNOSIS — M79.644 THUMB PAIN, RIGHT: ICD-10-CM

## 2024-05-08 DIAGNOSIS — G89.29 CHRONIC RIGHT-SIDED LOW BACK PAIN WITH RIGHT-SIDED SCIATICA: Primary | ICD-10-CM

## 2024-05-08 DIAGNOSIS — M54.41 CHRONIC RIGHT-SIDED LOW BACK PAIN WITH RIGHT-SIDED SCIATICA: Primary | ICD-10-CM

## 2024-05-08 PROCEDURE — 97112 NEUROMUSCULAR REEDUCATION: CPT

## 2024-05-08 PROCEDURE — 97110 THERAPEUTIC EXERCISES: CPT

## 2024-05-08 PROCEDURE — 97530 THERAPEUTIC ACTIVITIES: CPT

## 2024-05-08 NOTE — TELEPHONE ENCOUNTER
L4-L5: Uncovering the disc with superimposed disc bulge and annular fissure. Facet arthropathy. Moderate canal stenosis. Subarticular/lateral recess stenosis. Mild foraminal stenosis     L5-S1: Diffuse disc bulge with small superimposed central protrusion. Facet arthropathy. Mild canal stenosis. Right subarticular/lateral recess narrowing. Possible contact with the traversing S1 nerve roots. Severe bilateral foraminal stenosis..    Above MRI findings discussed with patient via phone call; will plan for ILESI at L5-S1 to target radicular pain if patient elects (will call back to schedule); informed consent to be obtained day of procedure.

## 2024-05-08 NOTE — PROGRESS NOTES
Daily Note     Today's date: 2024  Patient name: Fatmata Kwon  : 1955  MRN: [id number]  Referring provider: Willian Calvillo DO  Dx:   Encounter Diagnosis     ICD-10-CM    1. Chronic right-sided low back pain with right-sided sciatica  M54.41     G89.29       2. Thumb pain, right  M79.644           Start Time: 0850  Stop Time: 0950  Total time in clinic (min): 60 minutes    Subjective: Jasmin notes that she has noticed improvements in her endurance and R leg weakness while grocery shopping. Previously was limping with difficulty lifting R LE bye end of grocery shopping. Most recent trip she was able to complete trip without limping, still noted onset of heaviness though did not impact gait quality.    Objective: See treatment diary below      Assessment: Tolerated treatment well. Patient is progressing towards established goals nicely with improving endurance and longer duration of activity prior to onset of heaviness. Demonstrating decreased intensity of heaviness in R LE as well 6MWT completed today with total distance of 1315 feet.  She did report onset of heaviness in R LE around 2 minute 15 seconds ricardo. Though did not demonstrate any diminishing gait quality. She also stated that heaviness remained static throughout rest of timed trial. Pt continues to experience increased heaviness in rLE with hills and variable surfaces, plan to simulate in future sessions. Would benefit from skilled therapy services in order to maximize function    Plan: Continue per plan of care.      Precautions: n/a     Daily Treatment Diary:      Initial Evaluation Date: 24  Compliance            Visit Number 1 2  3  4  5  6           Re-Eval  IE                 MC   Foto Captured Y         nv                             Manual                      Lumbar, piri STM ->  8'  8'  -                                                           Ther-Ex          "             nustep   L3 7' L4 6 min L4 6' L4 6'                    Stretching: h/s/ piriformis  4x30\" 4x30\" ea 4x30\" ea 4x30\" 4x30\"       LTR ->  10x5\"  10x5\"  10x5\"  10x5\"             bridging ->  3x10  3x10  3x10  3x10  3x10           LAQs ->  3x10 2#  3x10 2#  3x10 2#  3x10  3x10  3#           SLRs ->  3x10 to 60 degrees 3x10 to 60 degrees  add 2#  3x10  3x10 2#  3x10 3#           Hip abd ->  5\"/5\" 2'  5\"/5\" 2'  5\"/5\" 2  5\"/5\" 2'  5\"/5\" 2'           Hip add ->  5\"/5\" 2'  5\"/5\" 2'  5\"/5\" 2  5\"/5\" 2'  5\"/5\" 2'                                 Mini squats   20x  20x  20x  20x  20x           Standing hip 3 ways   2x10  2x10  2x10 2#  2x10 2#  2x10 2#                                 Neuro Re-Ed                      Standing march c TRA iso hold     3x10 ea 2#  3x10 ea 2#  3x10 ea 2#  3x10 ea 2#           Step up c TRA hold   2x10 ea 2# 2x10 ea 2# 2x10 2#        Paloff press    Double blue  10xea Double blue 15x        Paloff c rotation    Double blue 10x ea Double blue 15x Double blue 15x                                 Ther-Act              stairs           5' consecutive asc/desc           Sit<>Stands      4 sets of 30\"       Med ball toss sideways     -> 15xea                     Modalities                                                                                            "

## 2024-05-10 ENCOUNTER — OFFICE VISIT (OUTPATIENT)
Dept: PHYSICAL THERAPY | Facility: CLINIC | Age: 69
End: 2024-05-10
Payer: COMMERCIAL

## 2024-05-10 DIAGNOSIS — M79.644 THUMB PAIN, RIGHT: ICD-10-CM

## 2024-05-10 DIAGNOSIS — G89.29 CHRONIC RIGHT-SIDED LOW BACK PAIN WITH RIGHT-SIDED SCIATICA: Primary | ICD-10-CM

## 2024-05-10 DIAGNOSIS — M54.41 CHRONIC RIGHT-SIDED LOW BACK PAIN WITH RIGHT-SIDED SCIATICA: Primary | ICD-10-CM

## 2024-05-10 PROCEDURE — 97530 THERAPEUTIC ACTIVITIES: CPT

## 2024-05-10 PROCEDURE — 97112 NEUROMUSCULAR REEDUCATION: CPT

## 2024-05-10 PROCEDURE — 97110 THERAPEUTIC EXERCISES: CPT

## 2024-05-10 NOTE — PROGRESS NOTES
"Daily Note     Today's date: 5/10/2024  Patient name: Fatmata Kwon  : 1955  MRN: [id number]  Referring provider: Willian Calvillo DO  Dx:   Encounter Diagnosis     ICD-10-CM    1. Chronic right-sided low back pain with right-sided sciatica  M54.41     G89.29       2. Thumb pain, right  M79.644           Start Time: 820  Stop Time: 920  Total time in clinic (min): 60 minutes    Subjective: States that she is no better or worse compared to last session.      Objective: See treatment diary below      Assessment: Tolerated treatment well. Progressed program to include obstacle course with varying terrain, stepping over obstacles, and uneven surfaces. Pt w/ some difficulty maintaining steadiness throughout course initially, though able to improve after several trials. Noted that she was able to complete without back or knee pain. Would cont. To benefit from skilled therapy services in order to maximize function      Plan: Continue per plan of care.      Precautions: n/a     Daily Treatment Diary:      Initial Evaluation Date: 24  Compliance 4/18  4/24  4/26  4/29  5/1  5/8  5/10         Visit Number 1 2  3  4  5  6  7         Re-Eval  IE                 MC   Foto Captured Y         nv  Y                4/18  4/24  4/26  4/29  5/1  5/8  5/10         Manual                      Lumbar, piri STM ->  8'  8'  -                                                           Ther-Ex                      nustep   L3 7' L4 6 min L4 6' L4 6' L4 6'                   Stretching: h/s/ piriformis  4x30\" 4x30\" ea 4x30\" ea 4x30\" 4x30\"       LTR ->  10x5\"  10x5\"  10x5\"  10x5\"             bridging ->  3x10  3x10  3x10  3x10  3x10  3x10         LAQs ->  3x10 2#  3x10 2#  3x10 2#  3x10  3x10  3#  3x10 3#         SLRs ->  3x10 to 60 degrees 3x10 to 60 degrees  add 2#  3x10  3x10 2#  3x10 3#  3x10 3#         Hip abd ->  5\"/5\" 2'  5\"/5\" 2'  5\"/5\" 2  5\"/5\" 2'  5\"/5\" 2'           Hip add ->  5\"\" 2'  5\"\" 2'  5\"\"   5\"\" " "2'  5\"/5\" 2'  5\"/5\" 2'                               Mini squats   20x  20x  20x  20x  20x  20x         Standing hip 3 ways   2x10  2x10  2x10 2#  2x10 2#  2x10 2#  2x10 3#                               Neuro Re-Ed                      Standing march c TRA iso hold     3x10 ea 2#  3x10 ea 2#  3x10 ea 2#  3x10 ea 2#  on AE pad  2'         Single leg balance on AE pad       2' total      Rocker board        M<>L   15x  Balance 2'      Step up c TRA hold   2x10 ea 2# 2x10 ea 2# 2x10 2#        Paloff press    Double blue  10xea Double blue 15x        Paloff c rotation    Double blue 10x ea Double blue 15x Double blue 15x       Obstacle course with uneven surfaces, hurdles, ae pad       10L total                   Ther-Act              stairs           5' consecutive asc/desc  5'consecutive asc/desc         Sit<>Stands      4 sets of 30\"       Med ball toss sideways     -> 15xea 15x ea                    Modalities                                                                                              "

## 2024-05-15 ENCOUNTER — OFFICE VISIT (OUTPATIENT)
Dept: PHYSICAL THERAPY | Facility: CLINIC | Age: 69
End: 2024-05-15
Payer: COMMERCIAL

## 2024-05-15 DIAGNOSIS — G89.29 CHRONIC RIGHT-SIDED LOW BACK PAIN WITH RIGHT-SIDED SCIATICA: Primary | ICD-10-CM

## 2024-05-15 DIAGNOSIS — M79.644 THUMB PAIN, RIGHT: ICD-10-CM

## 2024-05-15 DIAGNOSIS — M54.41 CHRONIC RIGHT-SIDED LOW BACK PAIN WITH RIGHT-SIDED SCIATICA: Primary | ICD-10-CM

## 2024-05-15 PROCEDURE — 97530 THERAPEUTIC ACTIVITIES: CPT

## 2024-05-15 PROCEDURE — 97110 THERAPEUTIC EXERCISES: CPT

## 2024-05-15 NOTE — PROGRESS NOTES
"Daily Note     Today's date: 5/15/2024  Patient name: Fatmata Kwon  : 1955  MRN: [id number]  Referring provider: Willian Calvillo DO  Dx:   Encounter Diagnosis     ICD-10-CM    1. Chronic right-sided low back pain with right-sided sciatica  M54.41     G89.29       2. Thumb pain, right  M79.644           Start Time: 0840  Stop Time: 930  Total time in clinic (min): 50 minutes    Subjective: states that her pain is improving and she notices it less and less during her day to day activities      Objective: See treatment diary below      Assessment: Tolerated treatment well. Patient demonstrated fatigue post treatment, exhibited good technique with therapeutic exercises, and would benefit from continued PT      Plan: Continue per plan of care.      Precautions: n/a     Daily Treatment Diary:      Initial Evaluation Date: 24  Compliance 4/18  4/24  4/26  4/29  5/1  5/8  5/10  5/15       Visit Number 1 2  3  4  5  6  7  8       Re-Eval  IE                 MC   Foto Captured Y         nv  Y                4/18  4/24  4/26  4/29  5/1  5/8  5/10  5/15       Manual                      Lumbar, piri STM ->  8'  8'  -                                                           Ther-Ex                      nustep   L3 7' L4 6 min L4 6' L4 6' L4 6' L4 8'                  Stretching: h/s/ piriformis  4x30\" 4x30\" ea 4x30\" ea 4x30\" 4x30\"       LTR ->  10x5\"  10x5\"  10x5\"  10x5\"             bridging ->  3x10  3x10  3x10  3x10  3x10  3x10  3x10       LAQs ->  3x10 2#  3x10 2#  3x10 2#  3x10  3x10  3#  3x10 3#  3x10 3#       SLRs ->  3x10 to 60 degrees 3x10 to 60 degrees  add 2#  3x10  3x10 2#  3x10 3#  3x10 3#  3X10 3#       Hip abd ->  5\"/5\" 2'  5\"/5\" 2'  5\"/5\" 2  5\"/5\" 2'  5\"/5\" 2'           Hip add ->  5\"/5\" 2'  5\"/5\" 2'  5\"/5\" 2  5\"/5\" 2'  5\"/5\" 2'  5\"/5\" 2'  5\"/5\" 2'                             Mini squats   20x  20x  20x  20x  20x  20x         Standing hip 3 ways   2x10  2x10  2x10 2#  2x10 2#  2x10 2#  2x10 " "3#  2X10 3#                             Neuro Re-Ed                      Standing march c TRA iso hold     3x10 ea 2#  3x10 ea 2#  3x10 ea 2#  3x10 ea 2#  on AE pad  2'  on AE pad  2'       Single leg balance on AE pad       2' total 2' total     Rocker board        M<>L   15x  Balance 2' M<>L   15x  Balance 2'     Step up c TRA hold   2x10 ea 2# 2x10 ea 2# 2x10 2#        Paloff press    Double blue  10xea Double blue 15x        Paloff c rotation    Double blue 10x ea Double blue 15x Double blue 15x       Obstacle course with uneven surfaces, hurdles, ae pad       10L total                   Ther-Act              stairs           5' consecutive asc/desc  5'consecutive asc/desc  5'consecutive asc/desc       Sit<>Stands      4 sets of 30\"       Med ball toss sideways     -> 15xea 15x ea 15x ea                   Modalities                                                                                                "

## 2024-05-17 ENCOUNTER — OFFICE VISIT (OUTPATIENT)
Dept: PHYSICAL THERAPY | Facility: CLINIC | Age: 69
End: 2024-05-17
Payer: COMMERCIAL

## 2024-05-17 DIAGNOSIS — M54.41 CHRONIC RIGHT-SIDED LOW BACK PAIN WITH RIGHT-SIDED SCIATICA: Primary | ICD-10-CM

## 2024-05-17 DIAGNOSIS — G89.29 CHRONIC RIGHT-SIDED LOW BACK PAIN WITH RIGHT-SIDED SCIATICA: Primary | ICD-10-CM

## 2024-05-17 DIAGNOSIS — M79.644 THUMB PAIN, RIGHT: ICD-10-CM

## 2024-05-17 PROCEDURE — 97530 THERAPEUTIC ACTIVITIES: CPT

## 2024-05-17 PROCEDURE — 97112 NEUROMUSCULAR REEDUCATION: CPT

## 2024-05-17 PROCEDURE — 97110 THERAPEUTIC EXERCISES: CPT

## 2024-05-17 NOTE — PROGRESS NOTES
"Daily Note / dc summary    Today's date: 2024  Patient name: Fatmata Kwon  : 1955  MRN: [id number]  Referring provider: Willian Calvillo DO  Dx:   Encounter Diagnosis     ICD-10-CM    1. Chronic right-sided low back pain with right-sided sciatica  M54.41     G89.29       2. Thumb pain, right  M79.644           Start Time: 0850  Stop Time: 935  Total time in clinic (min): 45 minutes    Subjective: states that she is noticing improvement in her back and knees and that she has been compliant with her HEP      Objective: See treatment diary below      Assessment: Tolerated treatment well. Patient demonstrated fatigue post treatment and exhibited good technique with therapeutic exercises. Pt has achieved all established goals. Educated pt  on final HEP, she demonstrate good understanding and form with all exercises. At this time, pt is appropriate for discharge and is in agreement with plan.      Plan:  discharge     Precautions: n/a     Daily Treatment Diary:      Initial Evaluation Date: 24  Compliance 4/18  4/24  4/26  4/29  5/1  5/8  5/10  5/15  5/17     Visit Number 1 2  3  4  5  6  7  8  9     Re-Eval  IE                 MC   Foto Captured Y         nv  Y                4/18  4/24  4/26  4/29  5/1  5/8  5/10  5/15  5/17     Manual                      Lumbar, piri STM ->  8'  8'  -                                                           Ther-Ex                      nustep   L3 7' L4 6 min L4 6' L4 6' L4 6' L4 8' L4 8'                 Stretching: h/s/ piriformis  4x30\" 4x30\" ea 4x30\" ea 4x30\" 4x30\"       LTR ->  10x5\"  10x5\"  10x5\"  10x5\"             bridging ->  3x10  3x10  3x10  3x10  3x10  3x10  3x10  3x10     LAQs ->  3x10 2#  3x10 2#  3x10 2#  3x10  3x10  3#  3x10 3#  3x10 3#  3x10     SLRs ->  3x10 to 60 degrees 3x10 to 60 degrees  add 2#  3x10  3x10 2#  3x10 3#  3x10 3#  3X10 3#  3x10 3#     Hip abd ->  5\"/5\" 2'  5\"/5\" 2'  5\"/\" 2  5\"/5\" 2'  5\"\" 2'           Hip add ->  5\"\" " "2'  5\"/5\" 2'  5\"/5\" 2  5\"/5\" 2'  5\"/5\" 2'  5\"/5\" 2'  5\"/5\" 2'  5\"/5\" 2'                           Mini squats   20x  20x  20x  20x  20x  20x         Standing hip 3 ways   2x10  2x10  2x10 2#  2x10 2#  2x10 2#  2x10 3#  2X10 3#  2x10 3#                           Neuro Re-Ed                      Standing march c TRA iso hold     3x10 ea 2#  3x10 ea 2#  3x10 ea 2#  3x10 ea 2#  on AE pad  2'  on AE pad  2'  on AE pad 2'     Single leg balance on AE pad       2' total 2' total 2' total    Rocker board        M<>L   15x  Balance 2' M<>L   15x  Balance 2' M<>L   15x  Balance 2'z    Step up c TRA hold   2x10 ea 2# 2x10 ea 2# 2x10 2#        Paloff press    Double blue  10xea Double blue 15x        Paloff c rotation    Double blue 10x ea Double blue 15x Double blue 15x       Obstacle course with uneven surfaces, hurdles, ae pad       10L total                   Ther-Act              stairs           5' consecutive asc/desc  5'consecutive asc/desc  5'consecutive asc/desc       Sit<>Stands      4 sets of 30\"       Med ball toss sideways     -> 15xea 15x ea 15x ea                   Modalities                                                                                                  "